# Patient Record
Sex: MALE | Race: WHITE | NOT HISPANIC OR LATINO
[De-identification: names, ages, dates, MRNs, and addresses within clinical notes are randomized per-mention and may not be internally consistent; named-entity substitution may affect disease eponyms.]

---

## 2018-05-08 ENCOUNTER — APPOINTMENT (OUTPATIENT)
Dept: GASTROENTEROLOGY | Facility: CLINIC | Age: 68
End: 2018-05-08

## 2018-05-08 ENCOUNTER — APPOINTMENT (OUTPATIENT)
Dept: GASTROENTEROLOGY | Facility: CLINIC | Age: 68
End: 2018-05-08
Payer: COMMERCIAL

## 2018-05-08 ENCOUNTER — TRANSCRIPTION ENCOUNTER (OUTPATIENT)
Age: 68
End: 2018-05-08

## 2018-05-08 VITALS
HEIGHT: 71 IN | BODY MASS INDEX: 31.78 KG/M2 | TEMPERATURE: 98.2 F | DIASTOLIC BLOOD PRESSURE: 80 MMHG | SYSTOLIC BLOOD PRESSURE: 120 MMHG | WEIGHT: 227 LBS

## 2018-05-08 DIAGNOSIS — Z87.39 PERSONAL HISTORY OF OTHER DISEASES OF THE MUSCULOSKELETAL SYSTEM AND CONNECTIVE TISSUE: ICD-10-CM

## 2018-05-08 DIAGNOSIS — Z82.49 FAMILY HISTORY OF ISCHEMIC HEART DISEASE AND OTHER DISEASES OF THE CIRCULATORY SYSTEM: ICD-10-CM

## 2018-05-08 DIAGNOSIS — M25.511 PAIN IN RIGHT SHOULDER: ICD-10-CM

## 2018-05-08 DIAGNOSIS — Z86.79 PERSONAL HISTORY OF OTHER DISEASES OF THE CIRCULATORY SYSTEM: ICD-10-CM

## 2018-05-08 DIAGNOSIS — Z78.9 OTHER SPECIFIED HEALTH STATUS: ICD-10-CM

## 2018-05-08 DIAGNOSIS — Z23 ENCOUNTER FOR IMMUNIZATION: ICD-10-CM

## 2018-05-08 DIAGNOSIS — Z87.891 PERSONAL HISTORY OF NICOTINE DEPENDENCE: ICD-10-CM

## 2018-05-08 DIAGNOSIS — R73.03 PREDIABETES.: ICD-10-CM

## 2018-05-08 DIAGNOSIS — Z80.8 FAMILY HISTORY OF MALIGNANT NEOPLASM OF OTHER ORGANS OR SYSTEMS: ICD-10-CM

## 2018-05-08 PROBLEM — Z00.00 ENCOUNTER FOR PREVENTIVE HEALTH EXAMINATION: Status: ACTIVE | Noted: 2018-05-08

## 2018-05-08 PROCEDURE — 99244 OFF/OP CNSLTJ NEW/EST MOD 40: CPT

## 2018-05-08 PROCEDURE — 82270 OCCULT BLOOD FECES: CPT

## 2018-05-11 ENCOUNTER — RX RENEWAL (OUTPATIENT)
Age: 68
End: 2018-05-11

## 2018-05-18 ENCOUNTER — OUTPATIENT (OUTPATIENT)
Dept: OUTPATIENT SERVICES | Facility: HOSPITAL | Age: 68
LOS: 1 days | Discharge: HOME | End: 2018-05-18
Payer: COMMERCIAL

## 2018-05-18 ENCOUNTER — APPOINTMENT (OUTPATIENT)
Dept: GASTROENTEROLOGY | Facility: HOSPITAL | Age: 68
End: 2018-05-18
Payer: COMMERCIAL

## 2018-05-18 ENCOUNTER — RESULT REVIEW (OUTPATIENT)
Age: 68
End: 2018-05-18

## 2018-05-18 DIAGNOSIS — D64.9 ANEMIA, UNSPECIFIED: ICD-10-CM

## 2018-05-18 LAB — GLUCOSE BLDC GLUCOMTR-MCNC: 160 MG/DL — HIGH (ref 70–99)

## 2018-05-18 PROCEDURE — 43239 EGD BIOPSY SINGLE/MULTIPLE: CPT | Mod: 59

## 2018-05-18 PROCEDURE — 43251 EGD REMOVE LESION SNARE: CPT | Mod: 59

## 2018-05-18 PROCEDURE — 45378 DIAGNOSTIC COLONOSCOPY: CPT

## 2018-05-18 PROCEDURE — 88305 TISSUE EXAM BY PATHOLOGIST: CPT | Mod: 26

## 2018-05-22 LAB — SURGICAL PATHOLOGY STUDY: SIGNIFICANT CHANGE UP

## 2018-06-05 ENCOUNTER — APPOINTMENT (OUTPATIENT)
Dept: GASTROENTEROLOGY | Facility: CLINIC | Age: 68
End: 2018-06-05
Payer: COMMERCIAL

## 2018-06-05 VITALS
WEIGHT: 220 LBS | BODY MASS INDEX: 30.8 KG/M2 | SYSTOLIC BLOOD PRESSURE: 130 MMHG | HEIGHT: 71 IN | DIASTOLIC BLOOD PRESSURE: 70 MMHG | TEMPERATURE: 98.8 F

## 2018-06-05 PROCEDURE — 82270 OCCULT BLOOD FECES: CPT

## 2018-06-05 PROCEDURE — 99214 OFFICE O/P EST MOD 30 MIN: CPT

## 2018-06-05 RX ORDER — SODIUM SULFATE, POTASSIUM SULFATE, MAGNESIUM SULFATE 17.5; 3.13; 1.6 G/ML; G/ML; G/ML
17.5-3.13-1.6 SOLUTION, CONCENTRATE ORAL
Qty: 1 | Refills: 0 | Status: DISCONTINUED | COMMUNITY
Start: 2018-05-08 | End: 2018-06-05

## 2018-06-19 ENCOUNTER — APPOINTMENT (OUTPATIENT)
Dept: GASTROENTEROLOGY | Facility: CLINIC | Age: 68
End: 2018-06-19
Payer: COMMERCIAL

## 2018-06-19 PROCEDURE — 91110 GI TRC IMG INTRAL ESOPH-ILE: CPT

## 2018-07-05 ENCOUNTER — RX RENEWAL (OUTPATIENT)
Age: 68
End: 2018-07-05

## 2018-07-17 ENCOUNTER — RESULT REVIEW (OUTPATIENT)
Age: 68
End: 2018-07-17

## 2018-07-17 ENCOUNTER — OUTPATIENT (OUTPATIENT)
Dept: OUTPATIENT SERVICES | Facility: HOSPITAL | Age: 68
LOS: 1 days | Discharge: HOME | End: 2018-07-17
Payer: COMMERCIAL

## 2018-07-17 ENCOUNTER — APPOINTMENT (OUTPATIENT)
Dept: GASTROENTEROLOGY | Facility: HOSPITAL | Age: 68
End: 2018-07-17

## 2018-07-17 DIAGNOSIS — R19.5 OTHER FECAL ABNORMALITIES: ICD-10-CM

## 2018-07-17 PROCEDURE — 88307 TISSUE EXAM BY PATHOLOGIST: CPT | Mod: 26

## 2018-07-17 PROCEDURE — 43251 EGD REMOVE LESION SNARE: CPT | Mod: 59,GC

## 2018-07-17 PROCEDURE — 43270 EGD LESION ABLATION: CPT | Mod: GC

## 2018-07-26 ENCOUNTER — CHART COPY (OUTPATIENT)
Age: 68
End: 2018-07-26

## 2018-08-17 ENCOUNTER — OTHER (OUTPATIENT)
Age: 68
End: 2018-08-17

## 2018-09-05 ENCOUNTER — RX RENEWAL (OUTPATIENT)
Age: 68
End: 2018-09-05

## 2018-10-03 ENCOUNTER — RX RENEWAL (OUTPATIENT)
Age: 68
End: 2018-10-03

## 2018-10-05 ENCOUNTER — APPOINTMENT (OUTPATIENT)
Dept: GASTROENTEROLOGY | Facility: CLINIC | Age: 68
End: 2018-10-05
Payer: COMMERCIAL

## 2018-10-05 VITALS
TEMPERATURE: 97.9 F | WEIGHT: 223 LBS | HEIGHT: 71 IN | DIASTOLIC BLOOD PRESSURE: 80 MMHG | SYSTOLIC BLOOD PRESSURE: 125 MMHG | BODY MASS INDEX: 31.22 KG/M2

## 2018-10-05 DIAGNOSIS — Z09 ENCOUNTER FOR FOLLOW-UP EXAMINATION AFTER COMPLETED TREATMENT FOR CONDITIONS OTHER THAN MALIGNANT NEOPLASM: ICD-10-CM

## 2018-10-05 DIAGNOSIS — R19.5 OTHER FECAL ABNORMALITIES: ICD-10-CM

## 2018-10-05 PROCEDURE — 99214 OFFICE O/P EST MOD 30 MIN: CPT

## 2018-11-26 ENCOUNTER — OUTPATIENT (OUTPATIENT)
Dept: OUTPATIENT SERVICES | Facility: HOSPITAL | Age: 68
LOS: 1 days | End: 2018-11-26
Payer: COMMERCIAL

## 2018-11-26 VITALS
TEMPERATURE: 97 F | RESPIRATION RATE: 14 BRPM | OXYGEN SATURATION: 98 % | SYSTOLIC BLOOD PRESSURE: 126 MMHG | DIASTOLIC BLOOD PRESSURE: 80 MMHG | WEIGHT: 224.43 LBS | HEART RATE: 67 BPM | HEIGHT: 70.5 IN

## 2018-11-26 DIAGNOSIS — I10 ESSENTIAL (PRIMARY) HYPERTENSION: ICD-10-CM

## 2018-11-26 DIAGNOSIS — C44.90 UNSPECIFIED MALIGNANT NEOPLASM OF SKIN, UNSPECIFIED: Chronic | ICD-10-CM

## 2018-11-26 DIAGNOSIS — R73.03 PREDIABETES: ICD-10-CM

## 2018-11-26 DIAGNOSIS — K43.9 VENTRAL HERNIA WITHOUT OBSTRUCTION OR GANGRENE: ICD-10-CM

## 2018-11-26 DIAGNOSIS — M12.9 ARTHROPATHY, UNSPECIFIED: Chronic | ICD-10-CM

## 2018-11-26 DIAGNOSIS — R58 HEMORRHAGE, NOT ELSEWHERE CLASSIFIED: Chronic | ICD-10-CM

## 2018-11-26 DIAGNOSIS — R06.83 SNORING: ICD-10-CM

## 2018-11-26 DIAGNOSIS — H26.9 UNSPECIFIED CATARACT: Chronic | ICD-10-CM

## 2018-11-26 LAB
ALBUMIN SERPL ELPH-MCNC: 4.3 G/DL — SIGNIFICANT CHANGE UP (ref 3.3–5)
ALP SERPL-CCNC: 85 U/L — SIGNIFICANT CHANGE UP (ref 40–120)
ALT FLD-CCNC: 25 U/L — SIGNIFICANT CHANGE UP (ref 4–41)
AST SERPL-CCNC: 20 U/L — SIGNIFICANT CHANGE UP (ref 4–40)
BILIRUB SERPL-MCNC: 0.4 MG/DL — SIGNIFICANT CHANGE UP (ref 0.2–1.2)
BUN SERPL-MCNC: 22 MG/DL — SIGNIFICANT CHANGE UP (ref 7–23)
CALCIUM SERPL-MCNC: 9.6 MG/DL — SIGNIFICANT CHANGE UP (ref 8.4–10.5)
CHLORIDE SERPL-SCNC: 100 MMOL/L — SIGNIFICANT CHANGE UP (ref 98–107)
CO2 SERPL-SCNC: 21 MMOL/L — LOW (ref 22–31)
CREAT SERPL-MCNC: 1.26 MG/DL — SIGNIFICANT CHANGE UP (ref 0.5–1.3)
GLUCOSE SERPL-MCNC: 184 MG/DL — HIGH (ref 70–99)
HBA1C BLD-MCNC: 6.9 % — HIGH (ref 4–5.6)
HCT VFR BLD CALC: 36.3 % — LOW (ref 39–50)
HGB BLD-MCNC: 11.7 G/DL — LOW (ref 13–17)
MCHC RBC-ENTMCNC: 26.3 PG — LOW (ref 27–34)
MCHC RBC-ENTMCNC: 32.2 % — SIGNIFICANT CHANGE UP (ref 32–36)
MCV RBC AUTO: 81.6 FL — SIGNIFICANT CHANGE UP (ref 80–100)
NRBC # FLD: 0 — SIGNIFICANT CHANGE UP
PLATELET # BLD AUTO: 171 K/UL — SIGNIFICANT CHANGE UP (ref 150–400)
PMV BLD: 11.7 FL — SIGNIFICANT CHANGE UP (ref 7–13)
POTASSIUM SERPL-MCNC: 3.9 MMOL/L — SIGNIFICANT CHANGE UP (ref 3.5–5.3)
POTASSIUM SERPL-SCNC: 3.9 MMOL/L — SIGNIFICANT CHANGE UP (ref 3.5–5.3)
PROT SERPL-MCNC: 7.6 G/DL — SIGNIFICANT CHANGE UP (ref 6–8.3)
RBC # BLD: 4.45 M/UL — SIGNIFICANT CHANGE UP (ref 4.2–5.8)
RBC # FLD: 15.4 % — HIGH (ref 10.3–14.5)
SODIUM SERPL-SCNC: 135 MMOL/L — SIGNIFICANT CHANGE UP (ref 135–145)
WBC # BLD: 8.58 K/UL — SIGNIFICANT CHANGE UP (ref 3.8–10.5)
WBC # FLD AUTO: 8.58 K/UL — SIGNIFICANT CHANGE UP (ref 3.8–10.5)

## 2018-11-26 PROCEDURE — 93010 ELECTROCARDIOGRAM REPORT: CPT

## 2018-11-26 RX ORDER — SODIUM CHLORIDE 9 MG/ML
1000 INJECTION, SOLUTION INTRAVENOUS
Qty: 0 | Refills: 0 | Status: DISCONTINUED | OUTPATIENT
Start: 2018-12-10 | End: 2018-12-25

## 2018-11-26 NOTE — H&P PST ADULT - PROBLEM SELECTOR PLAN 3
Await old ekg for comparison from pcp Await old ekg for comparison from pcp  * Instructed to stop metformin 24 hours before surgery  * notified OR booking via fax of prediabetes  * Instructed to take normal am dose of valsartan - Hctz, and omeprazole the am of surgery

## 2018-11-26 NOTE — H&P PST ADULT - NSANTHOSAYNRD_GEN_A_CORE
No. HUMBLE screening performed.  STOP BANG Legend: 0-2 = LOW Risk; 3-4 = INTERMEDIATE Risk; 5-8 = HIGH Risk

## 2018-11-26 NOTE — H&P PST ADULT - PMH
Alcohol abuse  quit in 2015  Anemia  "had 2 tears in my small intestines which were cauterized approximately July 2018'  GERD (gastroesophageal reflux disease)    Hypercholesterolemia    Hypertension    Prediabetes  diagnosed in 2017  Ventral hernia  December 2018 Alcohol abuse  quit in 2015  Anemia  "had 2 tears in my small intestines which were cauterized approximately July 2018'  GERD (gastroesophageal reflux disease)    Hypercholesterolemia    Hypertension    Prediabetes  diagnosed in 2017  Snoring  HUMBLE precautions -- responds affirmatively to STOP BANG questionnaire  Ventral hernia  December 2018

## 2018-11-26 NOTE — H&P PST ADULT - PSH
Arthropathy  Dec. 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

## 2018-11-26 NOTE — H&P PST ADULT - HISTORY OF PRESENT ILLNESS
This is a 67 y/o male who presents with progressively worsening symptomatology of ventral hernia. Evaluated by MD who recommended intervention. Scheduled for repair of ventral hernia on 12-10-18

## 2018-11-26 NOTE — H&P PST ADULT - SOURCE OF INFORMATION, PROFILE
patient/cell [phone]; home [phone]; wife, Rachna, cell [phone] can receive information

## 2018-11-27 ENCOUNTER — RX RENEWAL (OUTPATIENT)
Age: 68
End: 2018-11-27

## 2018-12-09 ENCOUNTER — TRANSCRIPTION ENCOUNTER (OUTPATIENT)
Age: 68
End: 2018-12-09

## 2018-12-10 ENCOUNTER — OUTPATIENT (OUTPATIENT)
Dept: OUTPATIENT SERVICES | Facility: HOSPITAL | Age: 68
LOS: 1 days | Discharge: HOME | End: 2018-12-10

## 2018-12-10 VITALS
DIASTOLIC BLOOD PRESSURE: 69 MMHG | HEART RATE: 58 BPM | SYSTOLIC BLOOD PRESSURE: 106 MMHG | OXYGEN SATURATION: 96 % | TEMPERATURE: 98 F | RESPIRATION RATE: 16 BRPM | WEIGHT: 224.43 LBS | HEIGHT: 70 IN

## 2018-12-10 VITALS
SYSTOLIC BLOOD PRESSURE: 110 MMHG | TEMPERATURE: 97 F | OXYGEN SATURATION: 96 % | DIASTOLIC BLOOD PRESSURE: 91 MMHG | RESPIRATION RATE: 16 BRPM | HEART RATE: 63 BPM

## 2018-12-10 DIAGNOSIS — H26.9 UNSPECIFIED CATARACT: Chronic | ICD-10-CM

## 2018-12-10 DIAGNOSIS — C44.90 UNSPECIFIED MALIGNANT NEOPLASM OF SKIN, UNSPECIFIED: Chronic | ICD-10-CM

## 2018-12-10 DIAGNOSIS — M12.9 ARTHROPATHY, UNSPECIFIED: Chronic | ICD-10-CM

## 2018-12-10 DIAGNOSIS — K43.9 VENTRAL HERNIA WITHOUT OBSTRUCTION OR GANGRENE: ICD-10-CM

## 2018-12-10 DIAGNOSIS — R58 HEMORRHAGE, NOT ELSEWHERE CLASSIFIED: Chronic | ICD-10-CM

## 2018-12-10 LAB — GLUCOSE BLDC GLUCOMTR-MCNC: 161 MG/DL — HIGH (ref 70–99)

## 2018-12-10 RX ORDER — ONDANSETRON 8 MG/1
4 TABLET, FILM COATED ORAL ONCE
Qty: 0 | Refills: 0 | Status: DISCONTINUED | OUTPATIENT
Start: 2018-12-10 | End: 2018-12-10

## 2018-12-10 RX ORDER — FERROUS SULFATE 325(65) MG
0 TABLET ORAL
Qty: 0 | Refills: 0 | COMMUNITY

## 2018-12-10 RX ORDER — FENTANYL CITRATE 50 UG/ML
25 INJECTION INTRAVENOUS
Qty: 0 | Refills: 0 | Status: DISCONTINUED | OUTPATIENT
Start: 2018-12-10 | End: 2018-12-10

## 2018-12-10 RX ORDER — OXYCODONE HYDROCHLORIDE 5 MG/1
10 TABLET ORAL ONCE
Qty: 0 | Refills: 0 | Status: DISCONTINUED | OUTPATIENT
Start: 2018-12-10 | End: 2018-12-25

## 2018-12-10 RX ORDER — FENTANYL CITRATE 50 UG/ML
50 INJECTION INTRAVENOUS ONCE
Qty: 0 | Refills: 0 | Status: DISCONTINUED | OUTPATIENT
Start: 2018-12-10 | End: 2018-12-10

## 2018-12-10 RX ORDER — METFORMIN HYDROCHLORIDE 850 MG/1
0 TABLET ORAL
Qty: 0 | Refills: 0 | COMMUNITY

## 2018-12-10 NOTE — ASU DISCHARGE PLAN (ADULT/PEDIATRIC). - NOTIFY
Unable to Urinate/Persistent Nausea and Vomiting/Pain not relieved by Medications/Fever greater than 101

## 2018-12-10 NOTE — BRIEF OPERATIVE NOTE - PROCEDURE
<<-----Click on this checkbox to enter Procedure Ventral hernia repair with mesh  12/10/2018    Active  [place]

## 2018-12-10 NOTE — ASU DISCHARGE PLAN (ADULT/PEDIATRIC). - CONDITIONS AT DISCHARGE
once pacu d/c criteria met once pacu d/c criteria met Verbalizing good understanding of discharge instructions and medication review.Discharge criteria met.  Cleared for discharge home by anesthesia.  Escorted to entrance  discharged home. Patient voided.

## 2018-12-10 NOTE — ASU PATIENT PROFILE, ADULT - PMH
Alcohol abuse  quit in 2015  Anemia  "had 2 tears in my small intestines which were cauterized approximately July 2018'  GERD (gastroesophageal reflux disease)    Hypercholesterolemia    Hypertension    Prediabetes  diagnosed in 2017  Snoring  HUMBLE precautions -- responds affirmatively to STOP BANG questionnaire  Ventral hernia  December 2018

## 2018-12-10 NOTE — ASU PREOP CHECKLIST - COMMENTS
pt took omeprazol and valsartan at 4 am with a sip of water pt took omeprazole and valsartan at 4 am with a sip of water

## 2018-12-10 NOTE — ASU DISCHARGE PLAN (ADULT/PEDIATRIC). - SPECIAL INSTRUCTIONS
- Ice for swelling  - Remove outer dressing tomorrow, leave steri strips in place  - Ibuprofen or Aleve for pain, Percocet for breakthrough pain  - follow up with Dr. Lee in the office in 7-10 days

## 2018-12-10 NOTE — ASU DISCHARGE PLAN (ADULT/PEDIATRIC). - MEDICATION SUMMARY - MEDICATIONS TO TAKE
I will START or STAY ON the medications listed below when I get home from the hospital:    oxyCODONE-acetaminophen 5 mg-325 mg oral tablet  -- 1-2 tab(s) by mouth every 6 hours, As Needed -for moderate pain - for severe pain MDD:6 tabs   -- Caution federal law prohibits the transfer of this drug to any person other  than the person for whom it was prescribed.  May cause drowsiness.  Alcohol may intensify this effect.  Use care when operating dangerous machinery.  This prescription cannot be refilled.  This product contains acetaminophen.  Do not use  with any other product containing acetaminophen to prevent possible liver damage.  Using more of this medication than prescribed may cause serious breathing problems.    -- Indication: For post op pain

## 2018-12-10 NOTE — ASU PATIENT PROFILE, ADULT - PSH
Arthropathy  Dec. 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

## 2018-12-30 ENCOUNTER — RX RENEWAL (OUTPATIENT)
Age: 68
End: 2018-12-30

## 2019-01-15 ENCOUNTER — APPOINTMENT (OUTPATIENT)
Dept: GASTROENTEROLOGY | Facility: CLINIC | Age: 69
End: 2019-01-15
Payer: MEDICARE

## 2019-01-15 VITALS
SYSTOLIC BLOOD PRESSURE: 132 MMHG | HEART RATE: 94 BPM | HEIGHT: 71 IN | TEMPERATURE: 98 F | OXYGEN SATURATION: 96 % | WEIGHT: 224 LBS | BODY MASS INDEX: 31.36 KG/M2 | RESPIRATION RATE: 16 BRPM | DIASTOLIC BLOOD PRESSURE: 80 MMHG

## 2019-01-15 DIAGNOSIS — Q27.33 ARTERIOVENOUS MALFORMATION OF DIGESTIVE SYSTEM VESSEL: ICD-10-CM

## 2019-01-15 DIAGNOSIS — D64.9 ANEMIA, UNSPECIFIED: ICD-10-CM

## 2019-01-15 PROCEDURE — 99214 OFFICE O/P EST MOD 30 MIN: CPT

## 2019-01-15 RX ORDER — FERROUS SULFATE TAB EC 324 MG (65 MG FE EQUIVALENT) 324 (65 FE) MG
324 (65 FE) TABLET DELAYED RESPONSE ORAL
Qty: 60 | Refills: 1 | Status: DISCONTINUED | COMMUNITY
Start: 2018-05-08 | End: 2019-01-15

## 2019-01-15 NOTE — ASSESSMENT
[FreeTextEntry1] : Patient is stable with improving blood counts and iron levels. He will continue iron supplements daily. He will reduce his PPI to every other day. FOBT will be checked

## 2019-01-15 NOTE — HISTORY OF PRESENT ILLNESS
[FreeTextEntry1] : Patient is a 68-year-old gentleman with improvement in his counts on iron supplements. His ferritin is still 11 but iron/TIBC 73/411, percent saturation 18%, H./H2 0.5/37.8, MCV 84.\par He is status post umbilical hernia repair. He denies any abdominal pain, his bowel movements are regular, he has no upper gastrointestinal symptoms. He continues to take one iron tablet per day.

## 2019-02-06 LAB — HEMOCCULT STL QL IA: NEGATIVE

## 2019-04-24 NOTE — H&P PST ADULT - PRO TOBACCO TYPE
Please send out result letter with the following note, thanks.    Romayne,    Ere are the results of your stress test which was normal as we discussed on the phone.  Lets try upping the exercise level and see if the breathing improves.    -Sapphire Glass, CNP cigarettes/quit 15 years ago

## 2019-08-21 ENCOUNTER — TRANSCRIPTION ENCOUNTER (OUTPATIENT)
Age: 69
End: 2019-08-21

## 2019-09-02 PROBLEM — Z09 FOLLOW UP: Status: ACTIVE | Noted: 2018-06-05

## 2021-02-09 PROBLEM — I10 ESSENTIAL (PRIMARY) HYPERTENSION: Chronic | Status: ACTIVE | Noted: 2018-11-26

## 2021-02-09 PROBLEM — F10.10 ALCOHOL ABUSE, UNCOMPLICATED: Chronic | Status: ACTIVE | Noted: 2018-11-26

## 2021-02-09 PROBLEM — K43.9 VENTRAL HERNIA WITHOUT OBSTRUCTION OR GANGRENE: Chronic | Status: ACTIVE | Noted: 2018-11-26

## 2021-02-09 PROBLEM — E78.00 PURE HYPERCHOLESTEROLEMIA, UNSPECIFIED: Chronic | Status: ACTIVE | Noted: 2018-11-26

## 2021-02-09 PROBLEM — D64.9 ANEMIA, UNSPECIFIED: Chronic | Status: ACTIVE | Noted: 2018-11-26

## 2021-02-09 PROBLEM — K21.9 GASTRO-ESOPHAGEAL REFLUX DISEASE WITHOUT ESOPHAGITIS: Chronic | Status: ACTIVE | Noted: 2018-11-26

## 2021-02-16 ENCOUNTER — OUTPATIENT (OUTPATIENT)
Dept: OUTPATIENT SERVICES | Facility: HOSPITAL | Age: 71
LOS: 1 days | Discharge: HOME | End: 2021-02-16
Payer: OTHER MISCELLANEOUS

## 2021-02-16 VITALS
OXYGEN SATURATION: 98 % | HEART RATE: 85 BPM | RESPIRATION RATE: 18 BRPM | TEMPERATURE: 98 F | WEIGHT: 225.53 LBS | HEIGHT: 71 IN | SYSTOLIC BLOOD PRESSURE: 123 MMHG | DIASTOLIC BLOOD PRESSURE: 76 MMHG

## 2021-02-16 DIAGNOSIS — M25.511 PAIN IN RIGHT SHOULDER: ICD-10-CM

## 2021-02-16 DIAGNOSIS — C44.90 UNSPECIFIED MALIGNANT NEOPLASM OF SKIN, UNSPECIFIED: Chronic | ICD-10-CM

## 2021-02-16 DIAGNOSIS — E78.5 HYPERLIPIDEMIA, UNSPECIFIED: ICD-10-CM

## 2021-02-16 DIAGNOSIS — E11.9 TYPE 2 DIABETES MELLITUS WITHOUT COMPLICATIONS: ICD-10-CM

## 2021-02-16 DIAGNOSIS — Z01.818 ENCOUNTER FOR OTHER PREPROCEDURAL EXAMINATION: ICD-10-CM

## 2021-02-16 DIAGNOSIS — M12.9 ARTHROPATHY, UNSPECIFIED: Chronic | ICD-10-CM

## 2021-02-16 DIAGNOSIS — K21.9 GASTRO-ESOPHAGEAL REFLUX DISEASE WITHOUT ESOPHAGITIS: ICD-10-CM

## 2021-02-16 DIAGNOSIS — R58 HEMORRHAGE, NOT ELSEWHERE CLASSIFIED: Chronic | ICD-10-CM

## 2021-02-16 DIAGNOSIS — I10 ESSENTIAL (PRIMARY) HYPERTENSION: ICD-10-CM

## 2021-02-16 DIAGNOSIS — H26.9 UNSPECIFIED CATARACT: Chronic | ICD-10-CM

## 2021-02-16 DIAGNOSIS — Z98.890 OTHER SPECIFIED POSTPROCEDURAL STATES: Chronic | ICD-10-CM

## 2021-02-16 DIAGNOSIS — S43.491D OTHER SPRAIN OF RIGHT SHOULDER JOINT, SUBSEQUENT ENCOUNTER: ICD-10-CM

## 2021-02-16 LAB
ANION GAP SERPL CALC-SCNC: 10 MMOL/L — SIGNIFICANT CHANGE UP (ref 5–17)
APTT BLD: 31.9 SEC — SIGNIFICANT CHANGE UP (ref 27.5–35.5)
BASOPHILS # BLD AUTO: 0.05 K/UL — SIGNIFICANT CHANGE UP (ref 0–0.2)
BASOPHILS NFR BLD AUTO: 0.6 % — SIGNIFICANT CHANGE UP (ref 0–2)
BUN SERPL-MCNC: 20 MG/DL — SIGNIFICANT CHANGE UP (ref 7–23)
CALCIUM SERPL-MCNC: 9.1 MG/DL — SIGNIFICANT CHANGE UP (ref 8.5–10.1)
CHLORIDE SERPL-SCNC: 105 MMOL/L — SIGNIFICANT CHANGE UP (ref 96–108)
CO2 SERPL-SCNC: 24 MMOL/L — SIGNIFICANT CHANGE UP (ref 22–31)
CREAT SERPL-MCNC: 1.29 MG/DL — SIGNIFICANT CHANGE UP (ref 0.5–1.3)
EOSINOPHIL # BLD AUTO: 0.44 K/UL — SIGNIFICANT CHANGE UP (ref 0–0.5)
EOSINOPHIL NFR BLD AUTO: 5.1 % — SIGNIFICANT CHANGE UP (ref 0–6)
GLUCOSE SERPL-MCNC: 191 MG/DL — HIGH (ref 70–99)
HCT VFR BLD CALC: 41.5 % — SIGNIFICANT CHANGE UP (ref 39–50)
HGB BLD-MCNC: 13.3 G/DL — SIGNIFICANT CHANGE UP (ref 13–17)
IMM GRANULOCYTES NFR BLD AUTO: 0.5 % — SIGNIFICANT CHANGE UP (ref 0–1.5)
INR BLD: 1.04 RATIO — SIGNIFICANT CHANGE UP (ref 0.88–1.16)
LYMPHOCYTES # BLD AUTO: 1.91 K/UL — SIGNIFICANT CHANGE UP (ref 1–3.3)
LYMPHOCYTES # BLD AUTO: 22.1 % — SIGNIFICANT CHANGE UP (ref 13–44)
MCHC RBC-ENTMCNC: 27 PG — SIGNIFICANT CHANGE UP (ref 27–34)
MCHC RBC-ENTMCNC: 32 GM/DL — SIGNIFICANT CHANGE UP (ref 32–36)
MCV RBC AUTO: 84.2 FL — SIGNIFICANT CHANGE UP (ref 80–100)
MONOCYTES # BLD AUTO: 0.59 K/UL — SIGNIFICANT CHANGE UP (ref 0–0.9)
MONOCYTES NFR BLD AUTO: 6.8 % — SIGNIFICANT CHANGE UP (ref 2–14)
NEUTROPHILS # BLD AUTO: 5.6 K/UL — SIGNIFICANT CHANGE UP (ref 1.8–7.4)
NEUTROPHILS NFR BLD AUTO: 64.9 % — SIGNIFICANT CHANGE UP (ref 43–77)
NRBC # BLD: 0 /100 WBCS — SIGNIFICANT CHANGE UP (ref 0–0)
PLATELET # BLD AUTO: 207 K/UL — SIGNIFICANT CHANGE UP (ref 150–400)
POTASSIUM SERPL-MCNC: 3.7 MMOL/L — SIGNIFICANT CHANGE UP (ref 3.5–5.3)
POTASSIUM SERPL-SCNC: 3.7 MMOL/L — SIGNIFICANT CHANGE UP (ref 3.5–5.3)
PROTHROM AB SERPL-ACNC: 12 SEC — SIGNIFICANT CHANGE UP (ref 10.6–13.6)
RBC # BLD: 4.93 M/UL — SIGNIFICANT CHANGE UP (ref 4.2–5.8)
RBC # FLD: 16.1 % — HIGH (ref 10.3–14.5)
SODIUM SERPL-SCNC: 139 MMOL/L — SIGNIFICANT CHANGE UP (ref 135–145)
WBC # BLD: 8.63 K/UL — SIGNIFICANT CHANGE UP (ref 3.8–10.5)
WBC # FLD AUTO: 8.63 K/UL — SIGNIFICANT CHANGE UP (ref 3.8–10.5)

## 2021-02-16 PROCEDURE — 93010 ELECTROCARDIOGRAM REPORT: CPT

## 2021-02-16 NOTE — H&P PST ADULT - NSICDXFAMILYHX_GEN_ALL_CORE_FT
FAMILY HISTORY:  Father  Still living? No  Family history of myocardial infarction, Age at diagnosis: Age Unknown

## 2021-02-16 NOTE — H&P PST ADULT - NSICDXPASTSURGICALHX_GEN_ALL_CORE_FT
PAST SURGICAL HISTORY:  Arthropathy July 2016, right knee replacement    Arthropathy Dec. 2016, left knee replacement    Bleeding with gastroscopy, repaired 2 tears in the "small intestines"    Cataract b/l eye surgery with lens implants    History of lymph node dissection of left axilla     Skin cancer squamous cell on arm, leg, and hand -- had Moh's surgery

## 2021-02-16 NOTE — H&P PST ADULT - ASSESSMENT
right shoulder rotator cuff tear  CAPRINI SCORE    AGE RELATED RISK FACTORS                                                       MOBILITY RELATED FACTORS  [ ] Age 41-60 years                                            (1 Point)                  [ ] Bed rest                                                        (1 Point)  [x ] Age: 61-74 years                                           (2 Points)                [ ] Plaster cast                                                   (2 Points)  [ ] Age= 75 years                                              (3 Points)                 [ ] Bed bound for more than 72 hours                   (2 Points)    DISEASE RELATED RISK FACTORS                                               GENDER SPECIFIC FACTORS  [ ] Edema in the lower extremities                       (1 Point)                  [ ] Pregnancy                                                     (1 Point)  [ ] Varicose veins                                               (1 Point)                  [ ] Post-partum < 6 weeks                                   (1 Point)             x[ ] BMI > 25 Kg/m2                                            (1 Point)                  [ ] Hormonal therapy  or oral contraception            (1 Point)                 [ ] Sepsis (in the previous month)                        (1 Point)                  [ ] History of pregnancy complications  [ ] Pneumonia or serious lung disease                                               [ ] Unexplained or recurrent                       (1 Point)           (in the previous month)                               (1 Point)  [ ] Abnormal pulmonary function test                     (1 Point)                 SURGERY RELATED RISK FACTORS  [ ] Acute myocardial infarction                              (1 Point)                 [ ]  Section                                            (1 Point)  [ ] Congestive heart failure (in the previous month)  (1 Point)                 [ ] Minor surgery                                                 (1 Point)   [ ] Inflammatory bowel disease                             (1 Point)                 [ x] Arthroscopic surgery                                        (2 Points)  [ ] Central venous access                                    (2 Points)                [ ] General surgery lasting more than 45 minutes   (2 Points)       [ ] Stroke (in the previous month)                          (5 Points)               [ ] Elective arthroplasty                                        (5 Points)                                                                                                                                               HEMATOLOGY RELATED FACTORS                                                 TRAUMA RELATED RISK FACTORS  [ ] Prior episodes of VTE                                     (3 Points)                 [ ] Fracture of the hip, pelvis, or leg                       (5 Points)  [ ] Positive family history for VTE                         (3 Points)                 [ ] Acute spinal cord injury (in the previous month)  (5 Points)  [ ] Prothrombin 17525 A                                      (3 Points)                 [ ] Paralysis  (less than 1 month)                          (5 Points)  [ ] Factor V Leiden                                             (3 Points)                 [ ] Multiple Trauma within 1 month                         (5 Points)  [ ] Lupus anticoagulants                                     (3 Points)                                                           [ ] Anticardiolipin antibodies                                (3 Points)                                                       [ ] High homocysteine in the blood                      (3 Points)                                             [ ] Other congenital or acquired thrombophilia       (3 Points)                                                [ ] Heparin induced thrombocytopenia                  (3 Points)                                          Total Score [      5    ]  Caprini Score 0-2: Low risk, No VTE Prophylaxis required for most patient's, encourage ambulation  Caprini Score 3-6: At Risk, Pharmacologic VTE prophylaxis is indicated for most patients ( in the absence of a contraindication)  Caprini Score Greater than or = 7: High Risk , pharmacologic VTE is indicated for most patients ( in the absence of a contraindication)    Caprini score indicates that the patient is high risk for VTE event ( score 6 or greater). Surgical patient's in this group will benefit from both pharmacologic prophylaxis and intermittent compression devices . Surgical team will determine the balance between VTE  risk and bleeding risk and other clinical considerations

## 2021-02-16 NOTE — H&P PST ADULT - ATTENDING COMMENTS
I discussed all possible risks of r shoulder surgery.  I discussed tears may not be repairable.  Pt wishes to proceed. No guarantees made all questions answered

## 2021-02-16 NOTE — H&P PST ADULT - NSICDXPROBLEM_GEN_ALL_CORE_FT
PROBLEM DIAGNOSES  Problem: Right shoulder pain  Assessment and Plan: scheduled for right shoulder arthroscopy    Problem: Preoperative examination  Assessment and Plan: Preop instructions provided including NPO status. Hibiclens wash for infection control. Patient aware to stop NSAID, OTC herbals  for 7-10 days, needs to be accoumpanied by adult upon discharge.  Patient verbalized understanding  patient instructed on having covid19 swab 3 days prior to surgery      Problem: HTN (hypertension)  Assessment and Plan: continue meds      Problem: Chronic GERD  Assessment and Plan: continue meds      Problem: HLD (hyperlipidemia)  Assessment and Plan: continue meds      Problem: DM (diabetes mellitus)  Assessment and Plan: continue meds

## 2021-02-16 NOTE — H&P PST ADULT - HISTORY OF PRESENT ILLNESS
71 yo male , pmh- htn, gerd,dm, hld c/o right shoulder pain secondary to work injury on sept 17, 2020. right shoulder paion worse despite conservative management- secondary to possible rotator cuff tear - scheduled for right shoulder arthroscopy  denies recent travels in the past 30 days. No fever, SOB, cough, flu like symptoms or body rash- covid screen

## 2021-02-16 NOTE — H&P PST ADULT - HEALTH CARE MAINTENANCE
flu vaccine   covid19 vaccine ( moderna)  1st dose january 31  pneumonia vaccine in the past    colonoscopy - ok

## 2021-02-16 NOTE — H&P PST ADULT - NSICDXPASTMEDICALHX_GEN_ALL_CORE_FT
PAST MEDICAL HISTORY:  Alcohol abuse quit in 2015    Anemia "had 2 tears in my small intestines which were cauterized approximately July 2018'    GERD (gastroesophageal reflux disease)     Hypercholesterolemia     Hypertension     Prediabetes diagnosed in 2017    Snoring HUMBLE precautions -- responds affirmatively to STOP BANG questionnaire    Ventral hernia December 2018

## 2021-02-17 LAB
A1C WITH ESTIMATED AVERAGE GLUCOSE RESULT: 7 % — HIGH (ref 4–5.6)
ESTIMATED AVERAGE GLUCOSE: 154 MG/DL — HIGH (ref 68–114)

## 2021-02-18 DIAGNOSIS — Z01.818 ENCOUNTER FOR OTHER PREPROCEDURAL EXAMINATION: ICD-10-CM

## 2021-02-19 ENCOUNTER — APPOINTMENT (OUTPATIENT)
Dept: DISASTER EMERGENCY | Facility: CLINIC | Age: 71
End: 2021-02-19

## 2021-02-20 LAB — SARS-COV-2 N GENE NPH QL NAA+PROBE: NOT DETECTED

## 2021-02-21 ENCOUNTER — TRANSCRIPTION ENCOUNTER (OUTPATIENT)
Age: 71
End: 2021-02-21

## 2021-02-22 ENCOUNTER — OUTPATIENT (OUTPATIENT)
Dept: OUTPATIENT SERVICES | Facility: HOSPITAL | Age: 71
LOS: 1 days | Discharge: HOME | End: 2021-02-22

## 2021-02-22 VITALS
OXYGEN SATURATION: 98 % | WEIGHT: 218.04 LBS | HEART RATE: 77 BPM | RESPIRATION RATE: 16 BRPM | TEMPERATURE: 98 F | DIASTOLIC BLOOD PRESSURE: 72 MMHG | SYSTOLIC BLOOD PRESSURE: 125 MMHG | HEIGHT: 71 IN

## 2021-02-22 VITALS
DIASTOLIC BLOOD PRESSURE: 64 MMHG | SYSTOLIC BLOOD PRESSURE: 119 MMHG | OXYGEN SATURATION: 95 % | RESPIRATION RATE: 20 BRPM | HEART RATE: 63 BPM

## 2021-02-22 DIAGNOSIS — M12.9 ARTHROPATHY, UNSPECIFIED: Chronic | ICD-10-CM

## 2021-02-22 DIAGNOSIS — H26.9 UNSPECIFIED CATARACT: Chronic | ICD-10-CM

## 2021-02-22 DIAGNOSIS — M75.100 UNSPECIFIED ROTATOR CUFF TEAR OR RUPTURE OF UNSPECIFIED SHOULDER, NOT SPECIFIED AS TRAUMATIC: ICD-10-CM

## 2021-02-22 DIAGNOSIS — R58 HEMORRHAGE, NOT ELSEWHERE CLASSIFIED: Chronic | ICD-10-CM

## 2021-02-22 DIAGNOSIS — C44.90 UNSPECIFIED MALIGNANT NEOPLASM OF SKIN, UNSPECIFIED: Chronic | ICD-10-CM

## 2021-02-22 DIAGNOSIS — Z98.890 OTHER SPECIFIED POSTPROCEDURAL STATES: Chronic | ICD-10-CM

## 2021-02-22 LAB — GLUCOSE BLDC GLUCOMTR-MCNC: 150 MG/DL — HIGH (ref 70–99)

## 2021-02-22 RX ORDER — SODIUM CHLORIDE 9 MG/ML
3 INJECTION INTRAMUSCULAR; INTRAVENOUS; SUBCUTANEOUS EVERY 8 HOURS
Refills: 0 | Status: DISCONTINUED | OUTPATIENT
Start: 2021-02-22 | End: 2021-02-22

## 2021-02-22 RX ORDER — HYDROMORPHONE HYDROCHLORIDE 2 MG/ML
0.25 INJECTION INTRAMUSCULAR; INTRAVENOUS; SUBCUTANEOUS
Refills: 0 | Status: DISCONTINUED | OUTPATIENT
Start: 2021-02-22 | End: 2021-02-22

## 2021-02-22 RX ORDER — OXYCODONE HYDROCHLORIDE 5 MG/1
1 TABLET ORAL
Qty: 42 | Refills: 0
Start: 2021-02-22 | End: 2021-02-28

## 2021-02-22 RX ORDER — ONDANSETRON 8 MG/1
4 TABLET, FILM COATED ORAL ONCE
Refills: 0 | Status: DISCONTINUED | OUTPATIENT
Start: 2021-02-22 | End: 2021-02-22

## 2021-02-22 RX ORDER — HYDROMORPHONE HYDROCHLORIDE 2 MG/ML
0.5 INJECTION INTRAMUSCULAR; INTRAVENOUS; SUBCUTANEOUS
Refills: 0 | Status: DISCONTINUED | OUTPATIENT
Start: 2021-02-22 | End: 2021-02-22

## 2021-02-22 RX ORDER — DOCUSATE SODIUM 100 MG
1 CAPSULE ORAL
Qty: 21 | Refills: 0
Start: 2021-02-22 | End: 2021-02-28

## 2021-02-22 RX ORDER — SODIUM CHLORIDE 9 MG/ML
1000 INJECTION, SOLUTION INTRAVENOUS
Refills: 0 | Status: DISCONTINUED | OUTPATIENT
Start: 2021-02-22 | End: 2021-02-22

## 2021-02-22 RX ADMIN — SODIUM CHLORIDE 100 MILLILITER(S): 9 INJECTION, SOLUTION INTRAVENOUS at 11:54

## 2021-02-22 NOTE — BRIEF OPERATIVE NOTE - OPERATION/FINDINGS
Right shoulder arthroscopy, massive rotator cuff tear, rotator cuff repair, labral debridement, subacromial decompression

## 2021-02-22 NOTE — ASU PATIENT PROFILE, ADULT - PSH
Arthropathy  Dec. 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

## 2021-02-22 NOTE — ASU DISCHARGE PLAN (ADULT/PEDIATRIC) - ASU DC SPECIAL INSTRUCTIONSFT
Post -Operative Shoulder Arthroscopy Instructions    Anesthesia:  - No alcoholic beverages, including beer and wine, for 24 hours or while on prescribed pain medications  - Do not make any important decisions or sign any legal documents  - Do not drive or operate machinery for 24 hours  - You are required upon discharge to leave the surgical center with a responsible adult who will drive you home    Surgery:  - Keep sling on until being seen in office.  You can move fingers, wrist and elbow while in sling  - Keep clean and dry for 3 days  - Remove dressing in 3 days and cover wounds with band-aids, you can then shower  - Take pain medication as prescribed  - Resume regular diet  - Follow-up in approximately 1 week      Notify your doctor if you develop: fever, chills, excessive swelling, drainage, pain not controlled by pain medication, persistent numbness in hand or fingers     IF AN EMERGENCY ARISES , CALL 911 AND/OR GO TO THE EMERGENCY ROOM

## 2021-02-22 NOTE — ASU DISCHARGE PLAN (ADULT/PEDIATRIC) - CARE PROVIDER_API CALL
Nelson Jacobson (DO)  Orthopaedic Surgery  [address] [place] [address]  Phone: [phone]  Fax: [phone]  Follow Up Time:
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
## 2021-02-25 DIAGNOSIS — K21.9 GASTRO-ESOPHAGEAL REFLUX DISEASE WITHOUT ESOPHAGITIS: ICD-10-CM

## 2021-02-25 DIAGNOSIS — E11.9 TYPE 2 DIABETES MELLITUS WITHOUT COMPLICATIONS: ICD-10-CM
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
## 2021-02-25 DIAGNOSIS — I10 ESSENTIAL (PRIMARY) HYPERTENSION: ICD-10-CM

## 2021-02-25 DIAGNOSIS — S43.431A SUPERIOR GLENOID LABRUM LESION OF RIGHT SHOULDER, INITIAL ENCOUNTER: ICD-10-CM

## 2021-02-25 DIAGNOSIS — M65.9 SYNOVITIS AND TENOSYNOVITIS, UNSPECIFIED: ICD-10-CM

## 2021-02-25 DIAGNOSIS — S43.491A OTHER SPRAIN OF RIGHT SHOULDER JOINT, INITIAL ENCOUNTER: ICD-10-CM

## 2021-02-25 DIAGNOSIS — Z87.891 PERSONAL HISTORY OF NICOTINE DEPENDENCE: ICD-10-CM

## 2021-02-25 DIAGNOSIS — Z79.84 LONG TERM (CURRENT) USE OF ORAL HYPOGLYCEMIC DRUGS: ICD-10-CM
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
## 2021-02-25 DIAGNOSIS — M75.51 BURSITIS OF RIGHT SHOULDER: ICD-10-CM

## 2021-02-25 DIAGNOSIS — E78.00 PURE HYPERCHOLESTEROLEMIA, UNSPECIFIED: ICD-10-CM

## 2021-02-25 DIAGNOSIS — S46.011A STRAIN OF MUSCLE(S) AND TENDON(S) OF THE ROTATOR CUFF OF RIGHT SHOULDER, INITIAL ENCOUNTER: ICD-10-CM

## 2021-02-25 DIAGNOSIS — Z85.828 PERSONAL HISTORY OF OTHER MALIGNANT NEOPLASM OF SKIN: ICD-10-CM

## 2021-02-25 DIAGNOSIS — Z96.653 PRESENCE OF ARTIFICIAL KNEE JOINT, BILATERAL: ICD-10-CM

## 2021-02-25 DIAGNOSIS — M75.41 IMPINGEMENT SYNDROME OF RIGHT SHOULDER: ICD-10-CM

## 2021-02-25 DIAGNOSIS — M24.111 OTHER ARTICULAR CARTILAGE DISORDERS, RIGHT SHOULDER: ICD-10-CM

## 2021-09-09 NOTE — ASU PATIENT PROFILE, ADULT - AS SC BRADEN NUTRITION
Writer has letter for work for pt regarding pending covid test results.  Called pt and left message to see if he wants this mailed to him or if he wants us to fax it.  If so, what is the fax number.     Left message for pt to call back.    (4) excellent

## 2022-07-11 ENCOUNTER — LABORATORY RESULT (OUTPATIENT)
Age: 72
End: 2022-07-11

## 2022-07-11 ENCOUNTER — APPOINTMENT (OUTPATIENT)
Dept: GASTROENTEROLOGY | Facility: CLINIC | Age: 72
End: 2022-07-11

## 2022-07-11 VITALS
BODY MASS INDEX: 29.82 KG/M2 | HEIGHT: 71 IN | WEIGHT: 213 LBS | DIASTOLIC BLOOD PRESSURE: 80 MMHG | TEMPERATURE: 97.2 F | OXYGEN SATURATION: 92 % | HEART RATE: 62 BPM | RESPIRATION RATE: 17 BRPM | SYSTOLIC BLOOD PRESSURE: 112 MMHG

## 2022-07-11 DIAGNOSIS — R63.5 ABNORMAL WEIGHT GAIN: ICD-10-CM

## 2022-07-11 DIAGNOSIS — K31.9 DISEASE OF STOMACH AND DUODENUM, UNSPECIFIED: ICD-10-CM

## 2022-07-11 DIAGNOSIS — R10.9 UNSPECIFIED ABDOMINAL PAIN: ICD-10-CM

## 2022-07-11 PROCEDURE — 99204 OFFICE O/P NEW MOD 45 MIN: CPT | Mod: 25

## 2022-07-11 PROCEDURE — 36415 COLL VENOUS BLD VENIPUNCTURE: CPT

## 2022-07-11 RX ORDER — TRAZODONE HYDROCHLORIDE 100 MG/1
100 TABLET ORAL
Qty: 90 | Refills: 0 | Status: DISCONTINUED | COMMUNITY
Start: 2017-11-17 | End: 2022-07-11

## 2022-07-11 NOTE — PHYSICAL EXAM
[General Appearance - Alert] : alert [General Appearance - In No Acute Distress] : in no acute distress [Sclera] : the sclera and conjunctiva were normal [PERRL With Normal Accommodation] : pupils were equal in size, round, and reactive to light [Extraocular Movements] : extraocular movements were intact [Outer Ear] : the ears and nose were normal in appearance [Oropharynx] : the oropharynx was normal [Neck Appearance] : the appearance of the neck was normal [Neck Cervical Mass (___cm)] : no neck mass was observed [Jugular Venous Distention Increased] : there was no jugular-venous distention [Thyroid Diffuse Enlargement] : the thyroid was not enlarged [Thyroid Nodule] : there were no palpable thyroid nodules [] : no respiratory distress [Auscultation Breath Sounds / Voice Sounds] : lungs were clear to auscultation bilaterally [Heart Rate And Rhythm] : heart rate was normal and rhythm regular [Heart Sounds] : normal S1 and S2 [Heart Sounds Gallop] : no gallops [Murmurs] : no murmurs [Heart Sounds Pericardial Friction Rub] : no pericardial rub [Obese] : obese [Normal] : normal to percussion [Diffuse] : there was tenderness diffusely on palpation [Cervical Lymph Nodes Enlarged Posterior Bilaterally] : posterior cervical [Cervical Lymph Nodes Enlarged Anterior Bilaterally] : anterior cervical [Supraclavicular Lymph Nodes Enlarged Bilaterally] : supraclavicular [Axillary Lymph Nodes Enlarged Bilaterally] : axillary [Femoral Lymph Nodes Enlarged Bilaterally] : femoral [Inguinal Lymph Nodes Enlarged Bilaterally] : inguinal [No CVA Tenderness] : no ~M costovertebral angle tenderness [No Spinal Tenderness] : no spinal tenderness [Abnormal Walk] : normal gait [Nail Clubbing] : no clubbing  or cyanosis of the fingernails [Musculoskeletal - Swelling] : no joint swelling seen [Motor Tone] : muscle strength and tone were normal [Deep Tendon Reflexes (DTR)] : deep tendon reflexes were 2+ and symmetric [Sensation] : the sensory exam was normal to light touch and pinprick [No Focal Deficits] : no focal deficits [Oriented To Time, Place, And Person] : oriented to person, place, and time [Impaired Insight] : insight and judgment were intact [Affect] : the affect was normal [Firm] : not firm [Rigid] : not rigid [Rebound] : no rebound [Guarding] : no guarding [Norris's] : a negative Norris's sign

## 2022-07-11 NOTE — HISTORY OF PRESENT ILLNESS
[Heartburn] : denies heartburn [Nausea] : denies nausea [Vomiting] : resolved vomiting [Diarrhea] : improved diarrhea [Constipation] : denies constipation [Yellow Skin Or Eyes (Jaundice)] : denies jaundice [Abdominal Pain] : stable abdominal pain [Abdominal Swelling] : denies abdominal swelling [Rectal Pain] : denies rectal pain [GERD] : gastroesophageal reflux disease [Malignancy] : malignancy [Abdominal Surgery] : abdominal surgery [Hiatus Hernia] : no hiatus hernia [Peptic Ulcer Disease] : no peptic ulcer disease [Pancreatitis] : no pancreatitis [Cholelithiasis] : no cholelithiasis [Kidney Stone] : no kidney stone [Inflammatory Bowel Disease] : no inflammatory bowel disease [Irritable Bowel Syndrome] : no irritable bowel syndrome [Diverticulitis] : no diverticulitis [Alcohol Abuse] : no alcohol abuse [Appendectomy] : no appendectomy [Cholecystectomy] : no cholecystectomy [de-identified] : 71 year old male presents for evaluation of resolved diarrhea and abdominal pain diffusely located but more tender on the left side. Patient states on June 27 he developed diarrhea a fever of 104 and abdominal pain. He is unsure if this was related to food that he consumed. He states he is no longer febrile and took Imodium x2 days and diarrhea resolved. He is still having abdominal tenderness. He does complain of a hemorrhoidal flare up that is currently resolving. Denies rectal bleeding, blood in the stool, melena, or hematemesis. Last Colonoscopy was 2018 as per report normal f/u colonoscopy due in 10 years.  [de-identified] : skin cancer \par umbilical hernia repair 2019

## 2022-07-11 NOTE — REVIEW OF SYSTEMS
[As Noted in HPI] : as noted in HPI [Abdominal Pain] : abdominal pain [Vomiting] : vomiting [Diarrhea] : diarrhea [Negative] : Heme/Lymph

## 2022-07-11 NOTE — ASSESSMENT
[FreeTextEntry1] : Attending Attestation \par Diarrhea\par \par We discussed diarrhea at length. Treatment depends on the cause and severity of your diarrhea. You\par should drink plenty of fluids to avoid dehydration. You should avoid drinks that contain caffeine and\par milk. Milk may make diarrhea worse. Your doctor may recommend hydration drinks for your infant or\par child. People with severe dehydration may need fluid replacement via an IV line and hospitalization.\par Avoid eating greasy foods, fatty foods, and alcohol. Bananas, applesauce, rice, and toast are helpful\par foods to eat. If you feel too sick to eat, try sucking on ice chips until you can tolerate food.\par \par If diarrhea returns may take Imodium but should call office to notify. May need stool cxs at that time.\par \par Ct Scan of the Abdomen and pelvis ordered The risks benefits alternatives and complications of the procedure/s were explained to the patient at\par length. The patient was agreeable and we will proceed. As per Dr Pa recommendations \par \par Cipro and Flagyl ordered for 7 days. Medication reviewed side effects and adverse reactions. \par \par Blood ordered as per patient request cbc, cmp, tsh, lipid, along with FOBT and Urinaysls \par \par Instructions provided how to obtain stool sample and where to return specimen. \par \par Time spent with patient 45 min \par \par Patient verbalized understanding of all information provided, All questions answered and reviewed.

## 2022-07-12 LAB
ALBUMIN SERPL ELPH-MCNC: 4.5 G/DL
ALP BLD-CCNC: 150 U/L
ALT SERPL-CCNC: 20 U/L
ANION GAP SERPL CALC-SCNC: 16 MMOL/L
APPEARANCE: CLEAR
AST SERPL-CCNC: 21 U/L
BASOPHILS # BLD AUTO: 0.06 K/UL
BASOPHILS NFR BLD AUTO: 0.4 %
BILIRUB SERPL-MCNC: 0.7 MG/DL
BILIRUBIN URINE: NEGATIVE
BLOOD URINE: NEGATIVE
BUN SERPL-MCNC: 13 MG/DL
CALCIUM SERPL-MCNC: 9.6 MG/DL
CHLORIDE SERPL-SCNC: 97 MMOL/L
CHOLEST SERPL-MCNC: 143 MG/DL
CO2 SERPL-SCNC: 27 MMOL/L
COLOR: YELLOW
CREAT SERPL-MCNC: 1.08 MG/DL
EGFR: 74 ML/MIN/1.73M2
EOSINOPHIL # BLD AUTO: 0.23 K/UL
EOSINOPHIL NFR BLD AUTO: 1.6 %
GLUCOSE QUALITATIVE U: NEGATIVE
GLUCOSE SERPL-MCNC: 131 MG/DL
HCT VFR BLD CALC: 41.5 %
HDLC SERPL-MCNC: 30 MG/DL
HGB BLD-MCNC: 13.1 G/DL
IMM GRANULOCYTES NFR BLD AUTO: 0.7 %
KETONES URINE: NEGATIVE
LDLC SERPL CALC-MCNC: 69 MG/DL
LEUKOCYTE ESTERASE URINE: NEGATIVE
LYMPHOCYTES # BLD AUTO: 1.92 K/UL
LYMPHOCYTES NFR BLD AUTO: 13.3 %
MAN DIFF?: NORMAL
MCHC RBC-ENTMCNC: 28.5 PG
MCHC RBC-ENTMCNC: 31.6 GM/DL
MCV RBC AUTO: 90.4 FL
MONOCYTES # BLD AUTO: 0.73 K/UL
MONOCYTES NFR BLD AUTO: 5.1 %
NEUTROPHILS # BLD AUTO: 11.35 K/UL
NEUTROPHILS NFR BLD AUTO: 78.9 %
NITRITE URINE: NEGATIVE
NONHDLC SERPL-MCNC: 112 MG/DL
PH URINE: 6
PLATELET # BLD AUTO: 284 K/UL
POTASSIUM SERPL-SCNC: 3.5 MMOL/L
PROT SERPL-MCNC: 7.5 G/DL
PROTEIN URINE: ABNORMAL
RBC # BLD: 4.59 M/UL
RBC # FLD: 14.8 %
SODIUM SERPL-SCNC: 140 MMOL/L
SPECIFIC GRAVITY URINE: 1.03
TRIGL SERPL-MCNC: 214 MG/DL
TSH SERPL-ACNC: 3.9 UIU/ML
UROBILINOGEN URINE: NORMAL
WBC # FLD AUTO: 14.39 K/UL

## 2022-07-13 ENCOUNTER — OUTPATIENT (OUTPATIENT)
Dept: OUTPATIENT SERVICES | Facility: HOSPITAL | Age: 72
LOS: 1 days | End: 2022-07-13
Payer: MEDICARE

## 2022-07-13 ENCOUNTER — APPOINTMENT (OUTPATIENT)
Dept: CT IMAGING | Facility: IMAGING CENTER | Age: 72
End: 2022-07-13

## 2022-07-13 DIAGNOSIS — R10.9 UNSPECIFIED ABDOMINAL PAIN: ICD-10-CM

## 2022-07-13 DIAGNOSIS — M12.9 ARTHROPATHY, UNSPECIFIED: Chronic | ICD-10-CM

## 2022-07-13 DIAGNOSIS — Z98.890 OTHER SPECIFIED POSTPROCEDURAL STATES: Chronic | ICD-10-CM

## 2022-07-13 DIAGNOSIS — C44.90 UNSPECIFIED MALIGNANT NEOPLASM OF SKIN, UNSPECIFIED: Chronic | ICD-10-CM

## 2022-07-13 DIAGNOSIS — Z00.8 ENCOUNTER FOR OTHER GENERAL EXAMINATION: ICD-10-CM

## 2022-07-13 DIAGNOSIS — K31.9 DISEASE OF STOMACH AND DUODENUM, UNSPECIFIED: ICD-10-CM

## 2022-07-13 DIAGNOSIS — R58 HEMORRHAGE, NOT ELSEWHERE CLASSIFIED: Chronic | ICD-10-CM

## 2022-07-13 DIAGNOSIS — H26.9 UNSPECIFIED CATARACT: Chronic | ICD-10-CM

## 2022-07-13 LAB — HEMOCCULT STL QL IA: NEGATIVE

## 2022-07-13 PROCEDURE — 74177 CT ABD & PELVIS W/CONTRAST: CPT

## 2022-07-13 PROCEDURE — 74177 CT ABD & PELVIS W/CONTRAST: CPT | Mod: 26

## 2022-07-18 ENCOUNTER — APPOINTMENT (OUTPATIENT)
Dept: GASTROENTEROLOGY | Facility: CLINIC | Age: 72
End: 2022-07-18

## 2022-07-18 VITALS
BODY MASS INDEX: 29.82 KG/M2 | HEIGHT: 71 IN | TEMPERATURE: 96.4 F | SYSTOLIC BLOOD PRESSURE: 103 MMHG | HEART RATE: 76 BPM | DIASTOLIC BLOOD PRESSURE: 63 MMHG | RESPIRATION RATE: 18 BRPM | WEIGHT: 213 LBS | OXYGEN SATURATION: 96 %

## 2022-07-18 DIAGNOSIS — K20.90 ESOPHAGITIS, UNSPECIFIED WITHOUT BLEEDING: ICD-10-CM

## 2022-07-18 DIAGNOSIS — K62.89 OTHER SPECIFIED DISEASES OF ANUS AND RECTUM: ICD-10-CM

## 2022-07-18 DIAGNOSIS — R19.7 DIARRHEA, UNSPECIFIED: ICD-10-CM

## 2022-07-18 PROCEDURE — 99215 OFFICE O/P EST HI 40 MIN: CPT

## 2022-07-18 RX ORDER — AMOXICILLIN 500 MG/1
500 CAPSULE ORAL
Qty: 21 | Refills: 0 | Status: DISCONTINUED | COMMUNITY
Start: 2022-02-28 | End: 2022-07-18

## 2022-07-18 RX ORDER — CEFADROXIL 500 MG/1
500 CAPSULE ORAL
Qty: 20 | Refills: 0 | Status: DISCONTINUED | COMMUNITY
Start: 2022-06-06 | End: 2022-07-18

## 2022-07-18 NOTE — ASSESSMENT
[FreeTextEntry1] : Diarrhea. Dietary and lifestyle modification discussed with the patient.  Patient finishing course of antibiotics.  We will get GI PCR for stool and stool C. 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  Patient referred to hepatology.\par Rectal pain rule out perirectal abscess.  Patient referred to surgery for evaluation.  Abnormal CT of the abdomen showing esophageal abnormality.  EGD to be scheduled.\par  This was all discussed with the patient.  He understands and all questions were answered.\par

## 2022-07-18 NOTE — PHYSICAL EXAM
[General Appearance - Alert] : alert [General Appearance - In No Acute Distress] : in no acute distress [Neck Appearance] : the appearance of the neck was normal [Neck Cervical Mass (___cm)] : no neck mass was observed [Jugular Venous Distention Increased] : there was no jugular-venous distention [Thyroid Diffuse Enlargement] : the thyroid was not enlarged [Thyroid Nodule] : there were no palpable thyroid nodules [Auscultation Breath Sounds / Voice Sounds] : lungs were clear to auscultation bilaterally [Heart Rate And Rhythm] : heart rate was normal and rhythm regular [Heart Sounds] : normal S1 and S2 [Heart Sounds Gallop] : no gallops [Murmurs] : no murmurs [Heart Sounds Pericardial Friction Rub] : no pericardial rub [Bowel Sounds] : normal bowel sounds [Abdomen Soft] : soft [Abdomen Tenderness] : non-tender [] : no hepato-splenomegaly [Abdomen Mass (___ Cm)] : no abdominal mass palpated [Normal Sphincter Tone] : normal sphincter tone [No Rectal Mass] : no rectal mass [External Hemorrhoid] : external hemorrhoids [Occult Blood Positive] : stool was negative for occult blood [FreeTextEntry1] : rectal tenderness r/o abcess [Cervical Lymph Nodes Enlarged Posterior Bilaterally] : posterior cervical [Cervical Lymph Nodes Enlarged Anterior Bilaterally] : anterior cervical [Supraclavicular Lymph Nodes Enlarged Bilaterally] : supraclavicular [Axillary Lymph Nodes Enlarged Bilaterally] : axillary [Femoral Lymph Nodes Enlarged Bilaterally] : femoral [Inguinal Lymph Nodes Enlarged Bilaterally] : inguinal [No CVA Tenderness] : no ~M costovertebral angle tenderness [No Spinal Tenderness] : no spinal tenderness

## 2022-07-18 NOTE — HISTORY OF PRESENT ILLNESS
[de-identified] : 71-year-old man feeling somewhat improved after taking the antibiotics.  He is completing his antibiotic regimen today.  However he still having significant rectal pain and tenderness with bowel movements.  His bowel movements remain loose but are less frequent.  He denies rectal bleeding, melena or hematemesis.  CT of the abdomen was significant for the presence of cirrhosis.  The patient is not aware of having cirrhosis of the liver although he was a heavy drinker until several years ago.  CT of the abdomen also showed some thickening in the distal esophagus.  EGD is recommended.  He has a history of gastric polyps that were resected in 2018.  He denies rectal bleeding, melena or hematemesis.

## 2022-07-28 ENCOUNTER — NON-APPOINTMENT (OUTPATIENT)
Age: 72
End: 2022-07-28

## 2022-07-28 LAB
C DIFF TOX GENS STL QL NAA+PROBE: NORMAL
CDIFF BY PCR: NOT DETECTED
GI PCR PANEL, STOOL: ABNORMAL

## 2022-08-03 LAB — GI PCR PANEL, STOOL: NORMAL

## 2022-08-17 ENCOUNTER — NON-APPOINTMENT (OUTPATIENT)
Age: 72
End: 2022-08-17

## 2022-08-22 ENCOUNTER — APPOINTMENT (OUTPATIENT)
Dept: GASTROENTEROLOGY | Facility: CLINIC | Age: 72
End: 2022-08-22

## 2022-08-23 DIAGNOSIS — Z20.822 ENCOUNTER FOR PREPROCEDURAL LABORATORY EXAMINATION: ICD-10-CM

## 2022-08-23 DIAGNOSIS — Z01.812 ENCOUNTER FOR PREPROCEDURAL LABORATORY EXAMINATION: ICD-10-CM

## 2022-08-25 ENCOUNTER — APPOINTMENT (OUTPATIENT)
Dept: GASTROENTEROLOGY | Facility: AMBULATORY MEDICAL SERVICES | Age: 72
End: 2022-08-25

## 2022-08-25 PROCEDURE — 43239 EGD BIOPSY SINGLE/MULTIPLE: CPT

## 2022-09-09 ENCOUNTER — APPOINTMENT (OUTPATIENT)
Dept: GASTROENTEROLOGY | Facility: CLINIC | Age: 72
End: 2022-09-09
Payer: MEDICARE

## 2022-09-09 VITALS
WEIGHT: 213 LBS | TEMPERATURE: 97.5 F | HEIGHT: 71 IN | HEART RATE: 80 BPM | OXYGEN SATURATION: 92 % | DIASTOLIC BLOOD PRESSURE: 77 MMHG | RESPIRATION RATE: 18 BRPM | BODY MASS INDEX: 29.82 KG/M2 | SYSTOLIC BLOOD PRESSURE: 131 MMHG

## 2022-09-09 DIAGNOSIS — E11.9 TYPE 2 DIABETES MELLITUS W/OUT COMPLICATIONS: ICD-10-CM

## 2022-09-09 DIAGNOSIS — R93.7 ABNORMAL FINDINGS ON DIAGNOSTIC IMAGING OF OTHER PARTS OF MUSCULOSKELETAL SYSTEM: ICD-10-CM

## 2022-09-09 DIAGNOSIS — K31.7 POLYP OF STOMACH AND DUODENUM: ICD-10-CM

## 2022-09-09 DIAGNOSIS — G62.9 POLYNEUROPATHY, UNSPECIFIED: ICD-10-CM

## 2022-09-09 PROCEDURE — 99214 OFFICE O/P EST MOD 30 MIN: CPT

## 2022-09-15 ENCOUNTER — APPOINTMENT (OUTPATIENT)
Dept: HEPATOLOGY | Facility: CLINIC | Age: 72
End: 2022-09-15

## 2022-09-15 VITALS
BODY MASS INDEX: 30.8 KG/M2 | HEART RATE: 70 BPM | SYSTOLIC BLOOD PRESSURE: 154 MMHG | WEIGHT: 220 LBS | HEIGHT: 71 IN | DIASTOLIC BLOOD PRESSURE: 78 MMHG | RESPIRATION RATE: 16 BRPM | OXYGEN SATURATION: 95 % | TEMPERATURE: 97.5 F

## 2022-09-15 DIAGNOSIS — R76.8 OTHER SPECIFIED ABNORMAL IMMUNOLOGICAL FINDINGS IN SERUM: ICD-10-CM

## 2022-09-15 DIAGNOSIS — Z87.898 PERSONAL HISTORY OF OTHER SPECIFIED CONDITIONS: ICD-10-CM

## 2022-09-15 DIAGNOSIS — E03.9 HYPOTHYROIDISM, UNSPECIFIED: ICD-10-CM

## 2022-09-15 DIAGNOSIS — E78.00 PURE HYPERCHOLESTEROLEMIA, UNSPECIFIED: ICD-10-CM

## 2022-09-15 DIAGNOSIS — Z86.59 PERSONAL HISTORY OF OTHER MENTAL AND BEHAVIORAL DISORDERS: ICD-10-CM

## 2022-09-15 DIAGNOSIS — E66.9 OBESITY, UNSPECIFIED: ICD-10-CM

## 2022-09-15 PROCEDURE — 99215 OFFICE O/P EST HI 40 MIN: CPT

## 2022-09-15 RX ORDER — HYDROCHLOROTHIAZIDE 25 MG/1
25 TABLET ORAL DAILY
Refills: 0 | Status: ACTIVE | COMMUNITY
Start: 2022-01-15

## 2022-09-15 RX ORDER — ATORVASTATIN CALCIUM 10 MG/1
10 TABLET, FILM COATED ORAL
Refills: 0 | Status: ACTIVE | COMMUNITY
Start: 2022-02-03

## 2022-09-15 RX ORDER — CIPROFLOXACIN HYDROCHLORIDE 500 MG/1
500 TABLET, FILM COATED ORAL TWICE DAILY
Qty: 14 | Refills: 0 | Status: DISCONTINUED | COMMUNITY
Start: 2022-07-11 | End: 2022-09-15

## 2022-09-15 RX ORDER — CHLORHEXIDINE GLUCONATE, 0.12% ORAL RINSE 1.2 MG/ML
0.12 SOLUTION DENTAL
Qty: 473 | Refills: 0 | Status: DISCONTINUED | COMMUNITY
Start: 2022-02-28 | End: 2022-09-15

## 2022-09-15 RX ORDER — METRONIDAZOLE 250 MG/1
250 TABLET ORAL 3 TIMES DAILY
Qty: 21 | Refills: 0 | Status: DISCONTINUED | COMMUNITY
Start: 2022-07-11 | End: 2022-09-15

## 2022-09-15 RX ORDER — SERTRALINE HYDROCHLORIDE 50 MG/1
50 TABLET, FILM COATED ORAL
Qty: 90 | Refills: 0 | Status: DISCONTINUED | COMMUNITY
Start: 2021-10-06 | End: 2022-09-15

## 2022-09-15 RX ORDER — VALSARTAN 320 MG/1
320 TABLET, COATED ORAL DAILY
Refills: 0 | Status: ACTIVE | COMMUNITY
Start: 2022-01-15

## 2022-09-15 RX ORDER — SERTRALINE 25 MG/1
25 TABLET, FILM COATED ORAL DAILY
Refills: 0 | Status: ACTIVE | COMMUNITY

## 2022-09-15 RX ORDER — VALSARTAN AND HYDROCHLOROTHIAZIDE 320; 25 MG/1; MG/1
320-25 TABLET, FILM COATED ORAL
Qty: 90 | Refills: 0 | Status: DISCONTINUED | COMMUNITY
Start: 2018-02-14 | End: 2022-09-15

## 2022-09-15 RX ORDER — DULAGLUTIDE 0.75 MG/.5ML
0.75 INJECTION, SOLUTION SUBCUTANEOUS
Refills: 0 | Status: ACTIVE | COMMUNITY

## 2022-09-15 RX ORDER — LEVOTHYROXINE SODIUM 0.03 MG/1
25 TABLET ORAL DAILY
Refills: 0 | Status: ACTIVE | COMMUNITY
Start: 2022-05-10

## 2022-09-15 RX ORDER — GABAPENTIN 400 MG/1
400 CAPSULE ORAL
Qty: 270 | Refills: 0 | Status: DISCONTINUED | COMMUNITY
Start: 2021-08-24 | End: 2022-09-15

## 2022-09-15 RX ORDER — GABAPENTIN 400 MG
400 TABLET ORAL 3 TIMES DAILY
Refills: 0 | Status: ACTIVE | COMMUNITY

## 2022-09-15 RX ORDER — METFORMIN HYDROCHLORIDE 500 MG/1
500 TABLET, COATED ORAL
Refills: 0 | Status: ACTIVE | COMMUNITY
Start: 2018-02-14

## 2022-09-15 RX ORDER — AZITHROMYCIN 500 MG/1
500 TABLET, FILM COATED ORAL DAILY
Qty: 1 | Refills: 0 | Status: DISCONTINUED | COMMUNITY
Start: 2022-07-29 | End: 2022-09-15

## 2022-09-15 RX ORDER — IBUPROFEN 600 MG/1
600 TABLET, FILM COATED ORAL
Qty: 20 | Refills: 0 | Status: DISCONTINUED | COMMUNITY
Start: 2022-02-28 | End: 2022-09-15

## 2022-09-18 ENCOUNTER — TRANSCRIPTION ENCOUNTER (OUTPATIENT)
Age: 72
End: 2022-09-18

## 2022-09-18 PROBLEM — R76.8 ELEVATED SERUM IMMUNOGLOBULIN FREE LIGHT CHAINS: Status: ACTIVE | Noted: 2022-09-18

## 2022-09-18 LAB
25(OH)D3 SERPL-MCNC: 39.1 NG/ML
A1AT SERPL-MCNC: 161 MG/DL
AFP-TM SERPL-MCNC: 2.7 NG/ML
ALBUMIN SERPL ELPH-MCNC: 4.8 G/DL
ALP BLD-CCNC: 102 U/L
ALT SERPL-CCNC: 22 U/L
ANION GAP SERPL CALC-SCNC: 13 MMOL/L
AST SERPL-CCNC: 21 U/L
BASOPHILS # BLD AUTO: 0.06 K/UL
BASOPHILS NFR BLD AUTO: 0.7 %
BILIRUB DIRECT SERPL-MCNC: 0.1 MG/DL
BILIRUB INDIRECT SERPL-MCNC: 0.2 MG/DL
BILIRUB SERPL-MCNC: 0.4 MG/DL
BUN SERPL-MCNC: 16 MG/DL
CALCIUM SERPL-MCNC: 10 MG/DL
CERULOPLASMIN SERPL-MCNC: 23 MG/DL
CHLORIDE SERPL-SCNC: 101 MMOL/L
CO2 SERPL-SCNC: 24 MMOL/L
CREAT SERPL-MCNC: 0.99 MG/DL
DEPRECATED KAPPA LC FREE/LAMBDA SER: 1.32 RATIO
EGFR: 82 ML/MIN/1.73M2
EOSINOPHIL # BLD AUTO: 0.52 K/UL
EOSINOPHIL NFR BLD AUTO: 6.2 %
FERRITIN SERPL-MCNC: 21 NG/ML
GLUCOSE SERPL-MCNC: 167 MG/DL
HBV CORE IGG+IGM SER QL: NONREACTIVE
HBV SURFACE AB SER QL: NONREACTIVE
HBV SURFACE AG SER QL: NONREACTIVE
HCT VFR BLD CALC: 42.5 %
HCV AB SER QL: NONREACTIVE
HCV S/CO RATIO: 0.11 S/CO
HEPATITIS A IGG ANTIBODY: NONREACTIVE
HGB BLD-MCNC: 13.5 G/DL
HIV1+2 AB SPEC QL IA.RAPID: NONREACTIVE
IGA SER QL IEP: 414 MG/DL
IGG SER QL IEP: 1110 MG/DL
IGM SER QL IEP: 64 MG/DL
IMM GRANULOCYTES NFR BLD AUTO: 0.4 %
INR PPP: 1.05 RATIO
IRON SATN MFR SERPL: 21 %
IRON SERPL-MCNC: 90 UG/DL
KAPPA LC CSF-MCNC: 3.11 MG/DL
KAPPA LC SERPL-MCNC: 4.1 MG/DL
LYMPHOCYTES # BLD AUTO: 1.76 K/UL
LYMPHOCYTES NFR BLD AUTO: 21 %
MAN DIFF?: NORMAL
MCHC RBC-ENTMCNC: 27.9 PG
MCHC RBC-ENTMCNC: 31.8 GM/DL
MCV RBC AUTO: 87.8 FL
MONOCYTES # BLD AUTO: 0.59 K/UL
MONOCYTES NFR BLD AUTO: 7 %
NEUTROPHILS # BLD AUTO: 5.42 K/UL
NEUTROPHILS NFR BLD AUTO: 64.7 %
PLATELET # BLD AUTO: 199 K/UL
POTASSIUM SERPL-SCNC: 4 MMOL/L
PROT SERPL-MCNC: 7.8 G/DL
PT BLD: 12.2 SEC
RBC # BLD: 4.84 M/UL
RBC # FLD: 14.3 %
SODIUM SERPL-SCNC: 138 MMOL/L
TIBC SERPL-MCNC: 423 UG/DL
UIBC SERPL-MCNC: 333 UG/DL
WBC # FLD AUTO: 8.38 K/UL

## 2022-09-18 NOTE — REVIEW OF SYSTEMS
[Recent Weight Loss (___ Lbs)] : recent [unfilled] ~Ulb weight loss [Depression] : depression [Negative] : Heme/Lymph [Fever] : no fever [Chills] : no chills [Abdominal Pain] : no abdominal pain [Vomiting] : no vomiting [Constipation] : no constipation [Diarrhea] : no diarrhea [Heartburn] : no heartburn [Melena] : no melena [Dysuria] : no dysuria [Itching] : no itching [Confused] : no confusion [FreeTextEntry2] : Few lb during 7/2022 [FreeTextEntry3] : Cataract surgery [FreeTextEntry7] : No nausea. No hematochezia.  [de-identified] : Controlled

## 2022-09-18 NOTE — PHYSICAL EXAM
[Spider Angioma] : Spider angioma(s) was(were) observed [Depression] : depression [General Appearance - Alert] : alert [General Appearance - In No Acute Distress] : in no acute distress [General Appearance - Well Nourished] : well nourished [General Appearance - Well Developed] : well developed [Sclera] : the sclera and conjunctiva were normal [Oropharynx] : the oropharynx was normal [Neck Appearance] : the appearance of the neck was normal [Jugular Venous Distention Increased] : there was no jugular-venous distention [] : no respiratory distress [Respiration, Rhythm And Depth] : normal respiratory rhythm and effort [Exaggerated Use Of Accessory Muscles For Inspiration] : no accessory muscle use [Auscultation Breath Sounds / Voice Sounds] : lungs were clear to auscultation bilaterally [Heart Rate And Rhythm] : heart rate was normal and rhythm regular [Heart Sounds] : normal S1 and S2 [Edema] : there was no peripheral edema [Obese] : obese [Normal] : normal [Soft, Nontender] : the abdomen was soft and nontender [None] : no CVA tenderness [Cervical Lymph Nodes Enlarged Posterior Bilaterally] : posterior cervical [Cervical Lymph Nodes Enlarged Anterior Bilaterally] : anterior cervical [Supraclavicular Lymph Nodes Enlarged Bilaterally] : supraclavicular [Axillary Lymph Nodes Enlarged Bilaterally] : axillary [Femoral Lymph Nodes Enlarged Bilaterally] : femoral [Inguinal Lymph Nodes Enlarged Bilaterally] : inguinal [No CVA Tenderness] : no ~M costovertebral angle tenderness [No Spinal Tenderness] : no spinal tenderness [Abnormal Walk] : normal gait [Oriented To Time, Place, And Person] : oriented to person, place, and time [Impaired Insight] : insight and judgment were intact [Affect] : the affect was normal [Mood] : the mood was normal [Scleral Icterus] : No Scleral Icterus [Abdominal  Ascites] : no ascites [Asterixis] : no asterixis observed [Jaundice] : No jaundice [Palmar Erythema] : no Palmar Erythema [FreeTextEntry7] : Controlled  [Dilated Collat. Veins] : no collateral vein dilation [Striae] : no striae [Firm] : not firm [Rigid] : not rigid [Rebound] : no rebound [Guarding] : no guarding [Norris's] : a negative Norris's sign [Liver Tender To Palpation] : not tender [FreeTextEntry1] : Grossly intact

## 2022-09-18 NOTE — HISTORY OF PRESENT ILLNESS
[Alcohol Abuse] : alcohol abuse [IV Drug Use] : no IV drug use [Tattoo] : no tattoos [Body Piercing] : no body piercing [Hemodialysis] : no hemodialysis [Transfusion before 1992] : no transfusion before 1992 [Transplant before 1992] : no transplant before 1992 [Cocaine Use] : no cocaine use [FreeTextEntry1] : 73 yo obese (BMI 30) Male with Hx of DM Type 2 (Dx around 2015, controlled on metformin and Trulicity), HLD (on atorvastatin), HTN, hypothyroidism, neuropathy, depression,  is here for initial evaluation for cirrhosis noted on CT a/p 7/13/22. Patient was not aware of liver disease prior. No FHx of liver disease.  He started to drink socially since 20s, but started to drink heavy at around age 62, daily, average 2/3 of a qt of Vodka, for about 2 years. He quit, was in a program. He resumed drinking around age 65. He has been completely abstinent since 3/2016. \par No other toxic habits. Denies Hx of hematemesis, ascites, jaundice, encephalopathy.\par EGD 8/25/22 showed erosive gastritis, hiatal hernia, 15 mm gastric polyp, ring in lower third of esophagus. Path: hyperplastic polyp. No HP. Mild chronic gastric inflammation. CT a/p showed esophageal wall thickening. \par Notably, the CT also showed a 1.7 cm lucent lesion in L2 spine, concerning for metastasis. \par He was seen in 7/2022 by GI, when found to have Camplylobacter infection.\par He had recent nuclear stress test on 9/13/22 and reportedly was normal. \par

## 2022-09-18 NOTE — ASSESSMENT
[FreeTextEntry1] : 73 yo obese (BMI 30) Male with Hx of DM Type (Dx around 2015, controlled on metformin and Trulicity), HLD (on atorvastatin), HTN, hypothyroidism, neuropathy, depression,  is here for initial evaluation for cirrhosis noted on CT a/p 7/13/22, without prior Hx of liver disease or signs of decompensation.Etiology possibly alcohol, but need to rule out other causes. \par \par \par Cirrhosis, without any signs or symptoms of decompensation.\par Ordered labs, including INR to calculate MELD, CPT. \par - Will get Fibroscan. \par - No ascites on physical exam and on US abdomen, no hx of SBP in past , not on SBP prophylaxis, on HCTZ for HTN. Normal BUN/Cr.\par - No signs of overt or minimal hepatic encephalopathy, avoid constipation.\par - Esophageal varix (EV) screening: Last EGD on 8/25/2022: no EVs. PLT > 150. Not on Beta blocker.\par - HCC screening: CT a/p w/ iv contrast 7/2022 w/o focal hepatic lesion, will repeat 1/2023. Will get AFP. \par - Etiology likely EtOH, but will send CLD workup.\par \par Health maintenance:\par - Will check Hep C, Hep B, Hep A serologies and HIV. \par - Will need vit D, DEXA. \par - Counseled on liver healthy diet, including but not limited to avoiding alcohol, illicit drug, avoiding eating any unpasteurized dairy products; avoiding eating any raw or undercooked eggs, fish, poultry, or meat; and avoiding eating raw/steamed oysters or other shellfish to avoid risk of Vibrio infection. \par - Advised on avoiding use of herbal and dietary supplements due to potential hepatotoxicity, and limit use of acetaminophen to <2 grams per day. Advised on avoiding use of nonsteroidal antiinflammatory drugs (NSAIDs) as these can precipitate renal dysfunction in patients with advanced liver disease. \par - F/u w/ PCP for flu, Pneumococcal vaccine, COVID-19\par - Diet, exercise as tolerated, weight loss.\par - Complete abstinence - will check PEth.\par \par Spinal lesion\par - Ordered bone scan, but further workup per PCP. Called his PCP and informed about result. \par \par Reports that his abdominal pain, diarrhea resolved, but patient to follow with GI and follow up GI recommendations. Colonoscopy? \par \par RTC 1 month

## 2022-09-20 ENCOUNTER — LABORATORY RESULT (OUTPATIENT)
Age: 72
End: 2022-09-20

## 2022-09-20 ENCOUNTER — APPOINTMENT (OUTPATIENT)
Dept: HEPATOLOGY | Facility: CLINIC | Age: 72
End: 2022-09-20
Payer: MEDICARE

## 2022-09-20 LAB
LKM AB SER QL IF: <20.1 UNITS
MITOCHONDRIA AB SER IF-ACNC: NORMAL
SMOOTH MUSCLE AB SER QL IF: NORMAL
SOLUBLE LIVER IGG SER IA-ACNC: 0.9
TTG IGA SER IA-ACNC: <1.2 U/ML
TTG IGA SER-ACNC: NEGATIVE

## 2022-09-20 PROCEDURE — 90472 IMMUNIZATION ADMIN EACH ADD: CPT

## 2022-09-20 PROCEDURE — 90739 HEPB VACC 2/4 DOSE ADULT IM: CPT

## 2022-09-20 PROCEDURE — 91200 LIVER ELASTOGRAPHY: CPT

## 2022-09-20 PROCEDURE — G0010: CPT

## 2022-09-20 PROCEDURE — 90632 HEPA VACCINE ADULT IM: CPT

## 2022-09-25 PROBLEM — G62.9 NEUROPATHY: Status: ACTIVE | Noted: 2022-09-15

## 2022-09-25 PROBLEM — E11.9 CONTROLLED TYPE 2 DIABETES MELLITUS: Status: ACTIVE | Noted: 2022-09-15

## 2022-09-25 PROBLEM — K31.7 GASTRIC POLYPS: Status: ACTIVE | Noted: 2018-10-05

## 2022-09-25 PROBLEM — R93.7 ABNORMAL CT OF SPINE: Status: ACTIVE | Noted: 2022-09-15

## 2022-09-25 RX ORDER — OMEPRAZOLE 40 MG/1
40 CAPSULE, DELAYED RELEASE ORAL
Qty: 90 | Refills: 0 | Status: ACTIVE | COMMUNITY
Start: 2018-04-27

## 2022-09-25 NOTE — ASSESSMENT
[FreeTextEntry1] : Patient with possible cirrhosis.  He was referred for hepatology consult.\par He also has a lesion on T12 which may be a metastatic lesion.  This needs to have further work-up.\par \par Patient had an upper endoscopy for thickening of the esophagus on CT scan.  There were some venous lakes versus cysts in the distal esophagus.  There was a Schatzki's ring and scope was passed with mild resistance.  He did have a hiatus hernia.  Patient had a 15 mm polyp.  Biopsies revealed this to be a hyperplastic polyp.\par Patient still has some dysphagia to both liquids and solids.  He will take omeprazole.\par He will need follow-up polypectomy and possible dilatation of the Schatzki's ring.

## 2022-09-25 NOTE — HISTORY OF PRESENT ILLNESS
[FreeTextEntry1] : Patient is a 72-year-old gentleman with history of diabetes, hypertension, neuropathy, and possible cirrhosis of the liver.  This was seen on CAT scan of 7/13/2022.  He does have a history of EtOH in the past.\par Patient had an EGD on 8/25/2022.  This showed evidence of gastritis and a gastric polyp.  Biopsies revealed this to be a hyperplastic polyp.  He also had a hiatus hernia and a Schatzki's ring in the distal esophagus.  CT scan revealed some thickening in the distal esophagus.  There were some venous lakes versus cysts seen in the distal esophagus.  The scope was passed with mild resistance.  Patient is on omeprazole.\par He was treated for a Campylobacter infection in July.\par CAT scan also revealed a lucent lesion at L2.  This is of concern for metastatic disease.

## 2022-09-25 NOTE — PHYSICAL EXAM
[Alert] : alert [Normal Voice/Communication] : normal voice/communication [Healthy Appearing] : healthy appearing [No Acute Distress] : no acute distress [Sclera] : the sclera and conjunctiva were normal [Hearing Threshold Finger Rub Not [place]] : hearing was normal [Normal Lips/Gums] : the lips and gums were normal [Oropharynx] : the oropharynx was normal [Normal Appearance] : the appearance of the neck was normal [No Neck Mass] : no neck mass was observed [No Respiratory Distress] : no respiratory distress [No Acc Muscle Use] : no accessory muscle use [Respiration, Rhythm And Depth] : normal respiratory rhythm and effort [Auscultation Breath Sounds / Voice Sounds] : lungs were clear to auscultation bilaterally [Heart Rate And Rhythm] : heart rate was normal and rhythm regular [Normal S1, S2] : normal S1 and S2 [Murmurs] : no murmurs [Bowel Sounds] : normal bowel sounds [Abdomen Tenderness] : non-tender [No Masses] : no abdominal mass palpated [Abdomen Soft] : soft [] : no hepatosplenomegaly [No CVA Tenderness] : no CVA  tenderness [No Spinal Tenderness] : no spinal tenderness [Abnormal Walk] : normal gait [Oriented To Time, Place, And Person] : oriented to person, place, and time

## 2022-09-26 ENCOUNTER — NON-APPOINTMENT (OUTPATIENT)
Age: 72
End: 2022-09-26

## 2022-09-27 ENCOUNTER — OUTPATIENT (OUTPATIENT)
Dept: OUTPATIENT SERVICES | Facility: HOSPITAL | Age: 72
LOS: 1 days | End: 2022-09-27
Payer: MEDICARE

## 2022-09-27 ENCOUNTER — APPOINTMENT (OUTPATIENT)
Dept: NUCLEAR MEDICINE | Facility: IMAGING CENTER | Age: 72
End: 2022-09-27

## 2022-09-27 ENCOUNTER — RESULT REVIEW (OUTPATIENT)
Age: 72
End: 2022-09-27

## 2022-09-27 DIAGNOSIS — R93.7 ABNORMAL FINDINGS ON DIAGNOSTIC IMAGING OF OTHER PARTS OF MUSCULOSKELETAL SYSTEM: ICD-10-CM

## 2022-09-27 DIAGNOSIS — Z00.8 ENCOUNTER FOR OTHER GENERAL EXAMINATION: ICD-10-CM

## 2022-09-27 DIAGNOSIS — M12.9 ARTHROPATHY, UNSPECIFIED: Chronic | ICD-10-CM

## 2022-09-27 DIAGNOSIS — R58 HEMORRHAGE, NOT ELSEWHERE CLASSIFIED: Chronic | ICD-10-CM

## 2022-09-27 DIAGNOSIS — Z98.890 OTHER SPECIFIED POSTPROCEDURAL STATES: Chronic | ICD-10-CM

## 2022-09-27 DIAGNOSIS — H26.9 UNSPECIFIED CATARACT: Chronic | ICD-10-CM

## 2022-09-27 DIAGNOSIS — C44.90 UNSPECIFIED MALIGNANT NEOPLASM OF SKIN, UNSPECIFIED: Chronic | ICD-10-CM

## 2022-09-27 PROCEDURE — 78306 BONE IMAGING WHOLE BODY: CPT | Mod: 26

## 2022-09-27 PROCEDURE — 78830 RP LOCLZJ TUM SPECT W/CT 1: CPT

## 2022-09-27 PROCEDURE — 78306 BONE IMAGING WHOLE BODY: CPT

## 2022-09-27 PROCEDURE — 78830 RP LOCLZJ TUM SPECT W/CT 1: CPT | Mod: 26

## 2022-09-27 PROCEDURE — A9561: CPT

## 2022-10-06 LAB
ALBUMIN MFR SERPL ELPH: 56.5 %
ALBUMIN SERPL-MCNC: 4.2 G/DL
ALBUMIN/GLOB SERPL: 1.3 RATIO
ALBUPE: 10.8 %
ALPHA1 GLOB MFR SERPL ELPH: 3.8 %
ALPHA1 GLOB SERPL ELPH-MCNC: 0.3 G/DL
ALPHA1UPE: 26.3 %
ALPHA2 GLOB MFR SERPL ELPH: 10.3 %
ALPHA2 GLOB SERPL ELPH-MCNC: 0.8 G/DL
ALPHA2UPE: 16.9 %
ANA PAT FLD IF-IMP: ABNORMAL
ANA SER IF-ACNC: ABNORMAL
B-GLOBULIN MFR SERPL ELPH: 13.1 %
B-GLOBULIN SERPL ELPH-MCNC: 1 G/DL
BETAUPE: 20.5 %
CREAT 24H UR-MCNC: NORMAL G/24 H
CREATININE UR (MAYO): 244 MG/DL
GAMMA GLOB FLD ELPH-MCNC: 1.2 G/DL
GAMMA GLOB MFR SERPL ELPH: 16.3 %
GAMMAUPE: 25.5 %
IGA 24H UR QL IFE: NORMAL
INTERPRETATION SERPL IEP-IMP: NORMAL
KAPPA LC 24H UR QL: NORMAL
M PROTEIN MFR SERPL ELPH: NORMAL
MONOCLON BAND OBS SERPL: NORMAL
PHOSPHATIDYETHANOL (PETH), WHOLE BLOOD: NEGATIVE NG/ML
PROT PATTERN 24H UR ELPH-IMP: NORMAL
PROT SERPL-MCNC: 7.5 G/DL
PROT SERPL-MCNC: 7.5 G/DL
PROT UR-MCNC: 77 MG/DL
PROT UR-MCNC: 77 MG/DL
SPECIMEN VOL 24H UR: NORMAL ML

## 2022-10-20 ENCOUNTER — APPOINTMENT (OUTPATIENT)
Dept: HEPATOLOGY | Facility: CLINIC | Age: 72
End: 2022-10-20

## 2022-10-20 DIAGNOSIS — Z23 ENCOUNTER FOR IMMUNIZATION: ICD-10-CM

## 2022-10-20 DIAGNOSIS — K74.60 UNSPECIFIED CIRRHOSIS OF LIVER: ICD-10-CM

## 2022-10-20 PROCEDURE — G0010: CPT

## 2022-10-20 PROCEDURE — 90739 HEPB VACC 2/4 DOSE ADULT IM: CPT

## 2022-10-26 ENCOUNTER — APPOINTMENT (OUTPATIENT)
Dept: GASTROENTEROLOGY | Facility: CLINIC | Age: 72
End: 2022-10-26

## 2022-10-26 VITALS
RESPIRATION RATE: 18 BRPM | BODY MASS INDEX: 30.52 KG/M2 | OXYGEN SATURATION: 98 % | SYSTOLIC BLOOD PRESSURE: 125 MMHG | DIASTOLIC BLOOD PRESSURE: 80 MMHG | HEIGHT: 71 IN | HEART RATE: 83 BPM | WEIGHT: 218 LBS | TEMPERATURE: 97 F

## 2022-10-26 DIAGNOSIS — R13.10 DYSPHAGIA, UNSPECIFIED: ICD-10-CM

## 2022-10-26 PROCEDURE — 99214 OFFICE O/P EST MOD 30 MIN: CPT

## 2022-10-26 NOTE — ASSESSMENT
[FreeTextEntry1] : 72 year old referred for possible EV, esophageal ring dilation, and gastric polyp.  Seen by liver already.  Pt with compensated cirrhosis.\par \par I discussed the proposed EUS (r/o varices), possible dilation, and possible gastric polyp removal with the patient.  I discussed the risks (bleeding, infection, perforation, pancreatitis, and anesthesia risks), benefits, and alternatives  with the patient. The patient agrees to proceed. \par \par Thank you for involving me in their care.\par \par Shane Keller MD\par  of Endoscopy, [facility]par Director of Endoscopy, [facility]\par , [facility]\par Associate Professor of Medicine\par [facility] [employer] at [facility] \par [facility]\par Phone: [phone]\par Fax: [phone]\par Email: [email or web address]\par \par \par

## 2022-10-26 NOTE — PHYSICAL EXAM
[Alert] : alert [Normal Voice/Communication] : normal voice/communication [Bowel Sounds] : normal bowel sounds [Abdomen Tenderness] : non-tender [Abdomen Soft] : soft [Oriented To Time, Place, And Person] : oriented to person, place, and time

## 2022-10-26 NOTE — CONSULT LETTER
[Dear  ___] : Dear  [unfilled], [Courtesy Letter:] : I had the pleasure of seeing your patient, [unfilled], in my office today. [Please see my note below.] : Please see my note below. [Sincerely,] : Sincerely, [FreeTextEntry2] : Denny Rodriguez MD\par [address] [facility]\[address] \[place] [address]\par Fax: [phone]\par Fax: [phone]\par \par  [FreeTextEntry3] : --\par Shane Keller MD\par  of Endoscopy, [facility]par Director of Endoscopy, [facility]\par , [facility]\par Associate Professor of Medicine\par [facility] [employer] at [facility] \par [facility]\par Phone: [phone]\par Fax: [phone]\par Email: [email or web address]\par

## 2022-10-26 NOTE — HISTORY OF PRESENT ILLNESS
[FreeTextEntry1] : 71 y/o male who states he is here after having an EGD and being noted to have a polyp in stomach, possible stricture and other issues of stomach.\par He states having dysphagia symptoms for the past few years. He mentions the dysphagia symptoms happen occasional which include liquids and solids. He states they feel like the get stuck in mid esophagus. \par Mentions being ETOH free for 7 years and being worked up for Cirrhosis by Dr Hill and was told he only has beginning signs with no concerning complications at the moment. \par \par Today he denies and other GI symptoms

## 2023-01-10 RX ORDER — SODIUM CHLORIDE 9 MG/ML
500 INJECTION, SOLUTION INTRAVENOUS
Refills: 0 | Status: DISCONTINUED | OUTPATIENT
Start: 2023-01-12 | End: 2023-01-26

## 2023-01-12 ENCOUNTER — OUTPATIENT (OUTPATIENT)
Dept: OUTPATIENT SERVICES | Facility: HOSPITAL | Age: 73
LOS: 1 days | Discharge: HOME | End: 2023-01-12
Payer: MEDICARE

## 2023-01-12 ENCOUNTER — RESULT REVIEW (OUTPATIENT)
Age: 73
End: 2023-01-12

## 2023-01-12 ENCOUNTER — APPOINTMENT (OUTPATIENT)
Dept: GASTROENTEROLOGY | Facility: HOSPITAL | Age: 73
End: 2023-01-12

## 2023-01-12 ENCOUNTER — TRANSCRIPTION ENCOUNTER (OUTPATIENT)
Age: 73
End: 2023-01-12

## 2023-01-12 VITALS
OXYGEN SATURATION: 97 % | HEART RATE: 58 BPM | SYSTOLIC BLOOD PRESSURE: 135 MMHG | HEIGHT: 71 IN | WEIGHT: 216.93 LBS | RESPIRATION RATE: 16 BRPM | TEMPERATURE: 98 F | DIASTOLIC BLOOD PRESSURE: 83 MMHG

## 2023-01-12 VITALS
DIASTOLIC BLOOD PRESSURE: 65 MMHG | SYSTOLIC BLOOD PRESSURE: 113 MMHG | OXYGEN SATURATION: 97 % | HEART RATE: 59 BPM | RESPIRATION RATE: 11 BRPM

## 2023-01-12 DIAGNOSIS — Z98.890 OTHER SPECIFIED POSTPROCEDURAL STATES: Chronic | ICD-10-CM

## 2023-01-12 DIAGNOSIS — C44.90 UNSPECIFIED MALIGNANT NEOPLASM OF SKIN, UNSPECIFIED: Chronic | ICD-10-CM

## 2023-01-12 DIAGNOSIS — M12.9 ARTHROPATHY, UNSPECIFIED: Chronic | ICD-10-CM

## 2023-01-12 DIAGNOSIS — H26.9 UNSPECIFIED CATARACT: Chronic | ICD-10-CM

## 2023-01-12 DIAGNOSIS — R58 HEMORRHAGE, NOT ELSEWHERE CLASSIFIED: Chronic | ICD-10-CM

## 2023-01-12 DIAGNOSIS — K31.7 POLYP OF STOMACH AND DUODENUM: ICD-10-CM

## 2023-01-12 PROCEDURE — 88305 TISSUE EXAM BY PATHOLOGIST: CPT | Mod: 26

## 2023-01-12 PROCEDURE — 43251 EGD REMOVE LESION SNARE: CPT | Mod: GC

## 2023-01-12 PROCEDURE — 43259 EGD US EXAM DUODENUM/JEJUNUM: CPT | Mod: GC

## 2023-01-12 DEVICE — CLIP RESOLUTION 360 235CM: Type: IMPLANTABLE DEVICE | Status: FUNCTIONAL

## 2023-01-12 DEVICE — IMPLANTABLE DEVICE: Type: IMPLANTABLE DEVICE | Status: FUNCTIONAL

## 2023-01-12 NOTE — ASU PATIENT PROFILE, ADULT - FALL HARM RISK - UNIVERSAL INTERVENTIONS
Bed in lowest position, wheels locked, appropriate side rails in place/Call bell, personal items and telephone in reach/Instruct patient to call for assistance before getting out of bed or chair/Non-slip footwear when patient is out of bed/[place] to call system/Physically safe environment - no spills, clutter or unnecessary equipment/Purposeful Proactive Rounding/Room/bathroom lighting operational, light cord in reach

## 2023-01-12 NOTE — ASU DISCHARGE PLAN (ADULT/PEDIATRIC) - NS MD DC FALL RISK RISK
For information on Fall & Injury Prevention, visit: [email or web address] OR  [email or web address] OR  https://www.cdc.gov/steadi/patient.html

## 2023-01-12 NOTE — PRE PROCEDURE NOTE - PRE PROCEDURE EVALUATION
Attending Physician:            VIJAY                Procedure: EGD/EUS/EMR/ possible dilation    Indication for Procedure: gastric polyp, rule out varices, dysphagia,   ________________________________________________________  PAST MEDICAL & SURGICAL HISTORY:  Hypertension      Hypercholesterolemia      GERD (gastroesophageal reflux disease)      Ventral hernia  December 2018      Anemia  &quot;had 2 tears in my small intestines which were cauterized approximately July 2018&#x27;      Prediabetes  diagnosed in 2017      Alcohol abuse  quit in 2015      Snoring  HUMBLE precautions -- responds affirmatively to STOP BANG questionnaire      Bleeding  with gastroscopy, repaired 2 tears in the &quot;small intestines&quot;      Cataract  b/l eye surgery with lens implants      Skin cancer  squamous cell on arm, leg, and hand -- had Moh&#x27;s surgery      Arthropathy  July 2016, right knee replacement      Arthropathy  Dec. 2016, left knee replacement      History of lymph node dissection of left axilla    Cirrhosis diagnosed 2022    ALLERGIES:  No Known Allergies    HOME MEDICATIONS:  atorvastatin: orally once a day  gabapentin 400 mg oral capsule: 1 cap(s) orally 3 times a day  hydroCHLOROthiazide 25 mg oral tablet: 1 tab(s) orally once a day  levothyroxine: orally once a day  metFORMIN 500 mg oral tablet: 1 tab(s) orally 2 times a day  omeprazole 40 mg oral delayed release capsule: 1 cap(s) orally once a day  sertraline 50 mg oral tablet: 1 tab(s) orally once a day  Trulicity Pen: subcutaneous once a week  valsartan 320 mg oral tablet: 1 tab(s) orally once a day    AICD/PPM: [ ] yes   [x ] no    PERTINENT LAB DATA:      see allscripts                PHYSICAL EXAMINATION:    Height (cm): 180.3  Weight (kg): 98.4  BMI (kg/m2): 30.3  BSA (m2): 2.18T(C): 36.5  HR: 58  BP: 135/83  RR: 16  SpO2: 97%    Constitutional: NAD  HEENT: PERRLA, EOMI,    Neck:  No JVD  Respiratory: CTAB/L  Cardiovascular: S1 and S2  Gastrointestinal: BS+, soft, NT/ND  Extremities: No peripheral edema  Neurological: A/O x 3, no focal deficits  Psychiatric: Normal mood, normal affect  Skin: No rashes    ASA Class: I [ ]  II [ ]  III [ x]  IV [ ]    COMMENTS:    The patient is a suitable candidate for the planned procedure unless box checked [ ]  No, explain:

## 2023-01-22 ENCOUNTER — NON-APPOINTMENT (OUTPATIENT)
Age: 73
End: 2023-01-22

## 2023-04-18 ENCOUNTER — APPOINTMENT (OUTPATIENT)
Dept: GASTROENTEROLOGY | Facility: CLINIC | Age: 73
End: 2023-04-18

## 2023-05-22 NOTE — ASU PATIENT PROFILE, ADULT - ACCEPTABLE
[Symptom and Test Evaluation] : symptom and test evaluation [Hyperlipidemia] : hyperlipidemia [Hypertension] : hypertension [Coronary Artery Disease] : coronary artery disease 5

## 2023-08-15 ENCOUNTER — INPATIENT (INPATIENT)
Facility: HOSPITAL | Age: 73
LOS: 0 days | Discharge: HOME | DRG: 603 | End: 2023-08-16
Attending: STUDENT IN AN ORGANIZED HEALTH CARE EDUCATION/TRAINING PROGRAM | Admitting: STUDENT IN AN ORGANIZED HEALTH CARE EDUCATION/TRAINING PROGRAM
Payer: MEDICARE

## 2023-08-15 VITALS
WEIGHT: 207.9 LBS | HEART RATE: 71 BPM | SYSTOLIC BLOOD PRESSURE: 121 MMHG | RESPIRATION RATE: 17 BRPM | HEIGHT: 71 IN | DIASTOLIC BLOOD PRESSURE: 78 MMHG | TEMPERATURE: 99 F | OXYGEN SATURATION: 96 %

## 2023-08-15 DIAGNOSIS — Z98.890 OTHER SPECIFIED POSTPROCEDURAL STATES: Chronic | ICD-10-CM

## 2023-08-15 DIAGNOSIS — C44.90 UNSPECIFIED MALIGNANT NEOPLASM OF SKIN, UNSPECIFIED: Chronic | ICD-10-CM

## 2023-08-15 DIAGNOSIS — M12.9 ARTHROPATHY, UNSPECIFIED: Chronic | ICD-10-CM

## 2023-08-15 DIAGNOSIS — R58 HEMORRHAGE, NOT ELSEWHERE CLASSIFIED: Chronic | ICD-10-CM

## 2023-08-15 DIAGNOSIS — L98.499 NON-PRESSURE CHRONIC ULCER OF SKIN OF OTHER SITES WITH UNSPECIFIED SEVERITY: ICD-10-CM

## 2023-08-15 DIAGNOSIS — H26.9 UNSPECIFIED CATARACT: Chronic | ICD-10-CM

## 2023-08-15 LAB
ALBUMIN SERPL ELPH-MCNC: 3.5 G/DL — SIGNIFICANT CHANGE UP (ref 3.5–5)
ALP SERPL-CCNC: 100 U/L — SIGNIFICANT CHANGE UP (ref 40–120)
ALT FLD-CCNC: 26 U/L DA — SIGNIFICANT CHANGE UP (ref 10–60)
ANION GAP SERPL CALC-SCNC: 3 MMOL/L — LOW (ref 5–17)
APPEARANCE UR: CLEAR — SIGNIFICANT CHANGE UP
APTT BLD: 32.3 SEC — SIGNIFICANT CHANGE UP (ref 24.5–35.6)
AST SERPL-CCNC: 22 U/L — SIGNIFICANT CHANGE UP (ref 10–40)
BASOPHILS # BLD AUTO: 0.04 K/UL — SIGNIFICANT CHANGE UP (ref 0–0.2)
BASOPHILS NFR BLD AUTO: 0.5 % — SIGNIFICANT CHANGE UP (ref 0–2)
BILIRUB SERPL-MCNC: 0.5 MG/DL — SIGNIFICANT CHANGE UP (ref 0.2–1.2)
BILIRUB UR-MCNC: NEGATIVE — SIGNIFICANT CHANGE UP
BUN SERPL-MCNC: 14 MG/DL — SIGNIFICANT CHANGE UP (ref 7–18)
CALCIUM SERPL-MCNC: 9 MG/DL — SIGNIFICANT CHANGE UP (ref 8.4–10.5)
CHLORIDE SERPL-SCNC: 106 MMOL/L — SIGNIFICANT CHANGE UP (ref 96–108)
CO2 SERPL-SCNC: 29 MMOL/L — SIGNIFICANT CHANGE UP (ref 22–31)
COLOR SPEC: YELLOW — SIGNIFICANT CHANGE UP
CREAT SERPL-MCNC: 1.12 MG/DL — SIGNIFICANT CHANGE UP (ref 0.5–1.3)
DIFF PNL FLD: NEGATIVE — SIGNIFICANT CHANGE UP
EGFR: 70 ML/MIN/1.73M2 — SIGNIFICANT CHANGE UP
EOSINOPHIL # BLD AUTO: 0.21 K/UL — SIGNIFICANT CHANGE UP (ref 0–0.5)
EOSINOPHIL NFR BLD AUTO: 2.5 % — SIGNIFICANT CHANGE UP (ref 0–6)
ERYTHROCYTE [SEDIMENTATION RATE] IN BLOOD: 28 MM/HR — HIGH (ref 0–20)
GLUCOSE SERPL-MCNC: 156 MG/DL — HIGH (ref 70–99)
GLUCOSE UR QL: NEGATIVE — SIGNIFICANT CHANGE UP
HCT VFR BLD CALC: 36 % — LOW (ref 39–50)
HGB BLD-MCNC: 11.7 G/DL — LOW (ref 13–17)
IMM GRANULOCYTES NFR BLD AUTO: 0.2 % — SIGNIFICANT CHANGE UP (ref 0–0.9)
INR BLD: 1.1 RATIO — SIGNIFICANT CHANGE UP (ref 0.85–1.18)
KETONES UR-MCNC: NEGATIVE — SIGNIFICANT CHANGE UP
LACTATE SERPL-SCNC: 1.3 MMOL/L — SIGNIFICANT CHANGE UP (ref 0.7–2)
LEUKOCYTE ESTERASE UR-ACNC: NEGATIVE — SIGNIFICANT CHANGE UP
LYMPHOCYTES # BLD AUTO: 1.72 K/UL — SIGNIFICANT CHANGE UP (ref 1–3.3)
LYMPHOCYTES # BLD AUTO: 20.7 % — SIGNIFICANT CHANGE UP (ref 13–44)
MCHC RBC-ENTMCNC: 27.5 PG — SIGNIFICANT CHANGE UP (ref 27–34)
MCHC RBC-ENTMCNC: 32.5 GM/DL — SIGNIFICANT CHANGE UP (ref 32–36)
MCV RBC AUTO: 84.7 FL — SIGNIFICANT CHANGE UP (ref 80–100)
MONOCYTES # BLD AUTO: 0.86 K/UL — SIGNIFICANT CHANGE UP (ref 0–0.9)
MONOCYTES NFR BLD AUTO: 10.4 % — SIGNIFICANT CHANGE UP (ref 2–14)
NEUTROPHILS # BLD AUTO: 5.45 K/UL — SIGNIFICANT CHANGE UP (ref 1.8–7.4)
NEUTROPHILS NFR BLD AUTO: 65.7 % — SIGNIFICANT CHANGE UP (ref 43–77)
NITRITE UR-MCNC: NEGATIVE — SIGNIFICANT CHANGE UP
NRBC # BLD: 0 /100 WBCS — SIGNIFICANT CHANGE UP (ref 0–0)
NT-PROBNP SERPL-SCNC: 111 PG/ML — SIGNIFICANT CHANGE UP (ref 0–125)
PH UR: 5 — SIGNIFICANT CHANGE UP (ref 5–8)
PLATELET # BLD AUTO: 162 K/UL — SIGNIFICANT CHANGE UP (ref 150–400)
POTASSIUM SERPL-MCNC: 3.4 MMOL/L — LOW (ref 3.5–5.3)
POTASSIUM SERPL-SCNC: 3.4 MMOL/L — LOW (ref 3.5–5.3)
PROT SERPL-MCNC: 7.5 G/DL — SIGNIFICANT CHANGE UP (ref 6–8.3)
PROT UR-MCNC: NEGATIVE — SIGNIFICANT CHANGE UP
PROTHROM AB SERPL-ACNC: 12.5 SEC — SIGNIFICANT CHANGE UP (ref 9.5–13)
RBC # BLD: 4.25 M/UL — SIGNIFICANT CHANGE UP (ref 4.2–5.8)
RBC # FLD: 14.4 % — SIGNIFICANT CHANGE UP (ref 10.3–14.5)
SODIUM SERPL-SCNC: 138 MMOL/L — SIGNIFICANT CHANGE UP (ref 135–145)
SP GR SPEC: 1.01 — SIGNIFICANT CHANGE UP (ref 1.01–1.02)
UROBILINOGEN FLD QL: NEGATIVE — SIGNIFICANT CHANGE UP
WBC # BLD: 8.3 K/UL — SIGNIFICANT CHANGE UP (ref 3.8–10.5)
WBC # FLD AUTO: 8.3 K/UL — SIGNIFICANT CHANGE UP (ref 3.8–10.5)

## 2023-08-15 PROCEDURE — 99285 EMERGENCY DEPT VISIT HI MDM: CPT

## 2023-08-15 PROCEDURE — 93923 UPR/LXTR ART STDY 3+ LVLS: CPT | Mod: 26

## 2023-08-15 PROCEDURE — 71045 X-RAY EXAM CHEST 1 VIEW: CPT | Mod: 26

## 2023-08-15 PROCEDURE — 73620 X-RAY EXAM OF FOOT: CPT | Mod: 26,RT

## 2023-08-15 PROCEDURE — 99222 1ST HOSP IP/OBS MODERATE 55: CPT

## 2023-08-15 RX ORDER — PIPERACILLIN AND TAZOBACTAM 4; .5 G/20ML; G/20ML
3.38 INJECTION, POWDER, LYOPHILIZED, FOR SOLUTION INTRAVENOUS ONCE
Refills: 0 | Status: COMPLETED | OUTPATIENT
Start: 2023-08-15 | End: 2023-08-15

## 2023-08-15 RX ORDER — VANCOMYCIN HCL 1 G
1000 VIAL (EA) INTRAVENOUS ONCE
Refills: 0 | Status: COMPLETED | OUTPATIENT
Start: 2023-08-15 | End: 2023-08-15

## 2023-08-15 RX ADMIN — PIPERACILLIN AND TAZOBACTAM 200 GRAM(S): 4; .5 INJECTION, POWDER, LYOPHILIZED, FOR SOLUTION INTRAVENOUS at 19:20

## 2023-08-15 RX ADMIN — Medication 250 MILLIGRAM(S): at 19:20

## 2023-08-15 NOTE — CONSULT NOTE ADULT - SUBJECTIVE AND OBJECTIVE BOX
Chief Complaint Quote	Pt sent by PCP for possible admission c/o pain, redness and swelling to Right 2nd toe.  Chief Complaint: 	Toe pain    Podiatry HPI: Patient stated he noticed this wound a couple days ago. Today he presented to his podiatrist office where they noted pus coming from the wound. Patient stated he also noticed swelling and redness. Patient states he is a diabetic with neuropathy and has had it for 20 years.     PMH:Hypertension    Hypercholesterolemia    GERD (gastroesophageal reflux disease)    Ventral hernia    Anemia    Prediabetes    Alcohol abuse    Snoring      Allergies: No Known Allergies    Medications:   FH:Family history of myocardial infarction (Father)      PSX: Bleeding    Cataract    Skin cancer    Arthropathy    Arthropathy    History of lymph node dissection of left axilla      SH:     Vital Signs Last 24 Hrs  T(C): 37 (15 Aug 2023 15:27), Max: 37 (15 Aug 2023 15:27)  T(F): 98.6 (15 Aug 2023 15:27), Max: 98.6 (15 Aug 2023 15:27)  HR: 71 (15 Aug 2023 15:27) (71 - 71)  BP: 121/78 (15 Aug 2023 15:27) (121/78 - 121/78)  BP(mean): --  RR: 17 (15 Aug 2023 15:27) (17 - 17)  SpO2: 96% (15 Aug 2023 15:27) (96% - 96%)    Parameters below as of 15 Aug 2023 15:27  Patient On (Oxygen Delivery Method): room air        LABS                      ROS  REVIEW OF SYSTEMS:    CONSTITUTIONAL: No fever, weight loss, or fatigue  EYES: No eye pain, visual disturbances, or discharge  ENMT:  No difficulty hearing, tinnitus, vertigo; No sinus or throat pain  NECK: No pain or stiffness  BREASTS: No pain, masses, or nipple discharge  RESPIRATORY: No cough, wheezing, chills or hemoptysis; No shortness of breath  CARDIOVASCULAR: No chest pain, palpitations, dizziness, or leg swelling  GASTROINTESTINAL: No abdominal or epigastric pain. No nausea, vomiting, or hematemesis; No diarrhea or constipation. No melena or hematochezia.  GENITOURINARY: No dysuria, frequency, hematuria, or incontinence  NEUROLOGICAL: No headaches, memory loss, loss of strength, numbness, or tremors  SKIN: No itching, burning, rashes, or lesions   LYMPH NODES: No enlarged glands  ENDOCRINE: No heat or cold intolerance; No hair loss  MUSCULOSKELETAL: No joint pain or swelling; No muscle, back, or extremity pain  PSYCHIATRIC: No depression, anxiety, mood swings, or difficulty sleeping  HEME/LYMPH: No easy bruising, or bleeding gums  ALLERY AND IMMUNOLOGIC: No hives or eczema      PHYSICAL EXAM  LE Focused:  RLE focused exam  Vasc:  DP and PT pulses palpable 2/4. CFT<3 seconds. TG warm to warm with no increase in warmth to second toe. Pedal hair present  Derm: Edema noted to second digit. Erythema noted in second digit about 2.5 cm dorsally over the forefoot. Distal tuft wound noted on second digit traveling laterally. Wound measures 3 cm x 1.5 cm x 0.2 cm. No PTB. No streaking/fluctuance/crepitus noted.   Neuro: Gross sensation diminished.  MSK: Patient was ambulating without assistance. Patient able to wiggle toes. Mild POP to second digit distal tuft.      IMAGING:  Xray: final read pending    CULTURES: pending

## 2023-08-15 NOTE — H&P ADULT - PROBLEM SELECTOR PLAN 1
juanito and zosyn   ID p/w Rigth second toe cellulitis, ESR H   s/p I&D in podiatrist office and ED  x-ray foot   start vanc and zosyn   f/u Bcux   ID consult   podiatry following p/w Rigth second toe cellulitis, ESR H   s/p I&D in podiatrist office and ED  x-ray foot   start vanc and zosyn   f/u Bcux   ID consult Dr. Snow  podiatry following

## 2023-08-15 NOTE — ED PROVIDER NOTE - CLINICAL SUMMARY MEDICAL DECISION MAKING FREE TEXT BOX
pt sent in by Podiatry with infected diabetic ulcer to his toe, will get labs., x-ray, also give Abx. Podiatry consult

## 2023-08-15 NOTE — H&P ADULT - NSHPPHYSICALEXAM_GEN_ALL_CORE
GENERAL: NAD   HEAD:  Atraumatic, Normocephalic  EYES:  conjunctiva and sclera clear  NECK: Supple, No JVD, Normal thyroid  CHEST/LUNG: Clear to auscultation. Clear to percussion bilaterally; No rales, rhonchi, wheezing, or rubs  HEART: Regular rate and rhythm; No murmurs, rubs, or gallops  ABDOMEN: Soft, Nontender, Nondistended; Bowel sounds present  NERVOUS SYSTEM:  Alert & Oriented X3,    EXTREMITIES:  2+ Peripheral Pulses, No clubbing, cyanosis, or edema, Edema and erythema  noted to second digit with wound   Neuro: Gross sensation diminished.  SKIN: warm dry

## 2023-08-15 NOTE — H&P ADULT - PROBLEM SELECTOR PLAN 5
hx DM on metformin 500 BID, and gabpentin 400 TID for neuropathy   c/w gabapentin   start sliding scale  f/u A1C

## 2023-08-15 NOTE — ED PROVIDER NOTE - OBJECTIVE STATEMENT
73-year-old male with history of prediabetes, last dose this morning.  Patient also with history of neuropathy.  Patient states on Sunday he noted there was skin on his right second toe, patient ripped the skin off and with bleeding.  Monday a.m. he woke up with swelling, pain to his right second toe.  Patient went to podiatry today noted that his purulent discharge and sent to ED.  Patient denies fever

## 2023-08-15 NOTE — H&P ADULT - HISTORY OF PRESENT ILLNESS
This 72 y/o male from home pmhx of DM with neuropathy, HLD, HTN, and depression presenting to the ED for toe swelling. Patient states on Sunday he picked on 'extra piece of skin" on right second toe which started bleeding. The following day he noticed swelling and tenderness of toe. He anny to his podiatrist Dr. Gaona who performed I&D in office.  This 72 y/o male from home pmhx of DM with neuropathy, HLD, HTN, and depression presenting to the ED for toe swelling. Patient states on Sunday he picked on 'extra piece of skin" on right second toe which started bleeding. The following day he noticed swelling and tenderness of toe. He anny to his podiatrist Dr. Gaona who performed I&D in office and was told to come to ED. in ED he was seen by podiatry minimal pus drained. Denies any fevers, chills, shortness of breath, abdominal pian, N/V/D.     In ED:   Vitals: BP: 119/66, HR: 66, 96% ON RA.   s/p zosyn and vanc   x-ray foot

## 2023-08-15 NOTE — ED PROVIDER NOTE - PROGRESS NOTE DETAILS
Labs/x-rays explained to pt & wife  pt was seen Podiatrist, advised to admit pt  case d/w Dr. Headley

## 2023-08-15 NOTE — H&P ADULT - ATTENDING COMMENTS
HPI  73 year old male patient with pmhx DM with diabetic neuropathy (for 20 years), Hypothyroidism, HTN, HLD, GERD sent to the ER by his doctor after pus was noted draining from his foot wound in the podiatry office.    A/P  # Diabetic Foot Ulcer  # Purulent Cellulitis  Patient states pus was removed and cultured by podiatry at their office  - IV Vancomycin and Zosyn  - Podiatry consulted  - Betadine and Dry Sterile Dressing  - Follow up Wound Culture of pus obtained by Podiatry    # Hx of DM  - Insulin Sliding Scale  - Can start 9 units Long Acting Insulin daily  - A1C    # Hx of Hypothyroidism  - Continue home Levothyroxine    # Hx of HTN  - Continue home antihypertensive medications    # Hx of HLD  - Continue home statin    # DVT PPx  - Lovenox    # FEN  - Monitor and replete electrolytes  - Diabetic diet HPI  73 year old male patient with pmhx DM with diabetic neuropathy (for 20 years), Hypothyroidism, HTN, HLD, GERD sent to the ER by his doctor after pus was noted draining from his foot wound in the podiatry office.    A/P  # Diabetic Foot Ulcer  # Purulent Cellulitis  Patient states pus was removed and cultured by podiatry at their office  - IV Vancomycin and Zosyn  - Podiatry consulted  - Betadine and Dry Sterile Dressing  - Follow up Wound Culture of pus obtained by Podiatry    # Hx of DM  - Insulin Sliding Scale  - Patient is insulin naive and on Metformin at home; can check blood sugars for 24 hours and if not at goal < 180 mg/dL can start low dose Long Acting Insulin daily  - A1C    # Hx of Hypothyroidism  - Continue home Levothyroxine    # Hx of HTN  - Continue home antihypertensive medications    # Hx of HLD  - Continue home statin    # DVT PPx  - Lovenox    # FEN  - Monitor and replete electrolytes  - Diabetic diet HPI  73 year old male patient with pmhx DM with diabetic neuropathy (for 20 years), Hypothyroidism, HTN, HLD, GERD sent to the ER by his doctor after pus was noted draining from his foot wound in the podiatry office.    Physical Exam  General: Awake, Alert, and Fully Oriented  Cardiac: RRR  Pulmonary: CTA b/l  Abdomen: Soft, ND, NT    A/P  # Diabetic Foot Ulcer  # Purulent Cellulitis  Patient states pus was removed and cultured by podiatry at their office  - IV Vancomycin and Zosyn  - Podiatry consulted  - Betadine and Dry Sterile Dressing  - Follow up Wound Culture of pus obtained by Podiatry    # Hx of DM  - Insulin Sliding Scale  - Patient is insulin naive and on Metformin at home; can check blood sugars for 24 hours and if not at goal < 180 mg/dL can start low dose Long Acting Insulin daily  - A1C    # Hx of Hypothyroidism  - Continue home Levothyroxine    # Hx of HTN  - Continue home antihypertensive medications    # Hx of HLD  - Continue home statin    # DVT PPx  - Lovenox    # FEN  - Monitor and replete electrolytes  - Diabetic diet HPI  73 year old male patient with pmhx DM with diabetic neuropathy (for 20 years), Hypothyroidism, HTN, HLD, GERD sent to the ER by his doctor after pus was noted draining from his foot wound in the podiatry office.    Physical Exam  General: Awake, Alert, and Fully Oriented  Cardiac: RRR  Pulmonary: CTA b/l  Abdomen: Soft, ND, NT  Extremities: Left 2nd toe with ulcer at bottom tip with swelling and erythema when unwrapped    A/P  # Diabetic Foot Ulcer  # Purulent Cellulitis  Patient states pus was removed and cultured by podiatry at their office  - IV Vancomycin and Zosyn  - Podiatry consulted  - Betadine and Dry Sterile Dressing  - Follow up Wound Culture of pus obtained by Podiatry    # Hx of DM  - Insulin Sliding Scale  - Patient is insulin naive and on Metformin at home; can check blood sugars for 24 hours and if not at goal < 180 mg/dL can start low dose Long Acting Insulin daily  - A1C    # Hx of Hypothyroidism  - Continue home Levothyroxine    # Hx of HTN  - Continue home antihypertensive medications    # Hx of HLD  - Continue home statin    # DVT PPx  - Lovenox    # FEN  - Monitor and replete electrolytes  - Diabetic diet HPI  73 year old male patient with pmhx DM with diabetic neuropathy (for 20 years), Hypothyroidism, HTN, HLD, GERD sent to the ER by his doctor after pus was noted draining from his foot wound in the podiatry office.    Physical Exam  General: Awake, Alert, and Fully Oriented  Cardiac: RRR  Pulmonary: CTA b/l  Abdomen: Soft, ND, NT  Extremities: Right 2nd toe with ulcer at bottom tip with swelling and erythema when unwrapped    A/P  # Diabetic Foot Ulcer  # Purulent Cellulitis  Patient states pus was removed and cultured by podiatry at their office  - IV Vancomycin and Zosyn  - Podiatry consulted  - Betadine and Dry Sterile Dressing  - Follow up Wound Culture of pus obtained by Podiatry    # Hx of DM  - Insulin Sliding Scale  - Patient is insulin naive and on Metformin at home; can check blood sugars for 24 hours and if not at goal < 180 mg/dL can start low dose Long Acting Insulin daily  - A1C    # Hx of Hypothyroidism  - Continue home Levothyroxine    # Hx of HTN  - Continue home antihypertensive medications    # Hx of HLD  - Continue home statin    # DVT PPx  - Lovenox    # FEN  - Monitor and replete electrolytes  - Diabetic diet

## 2023-08-15 NOTE — CONSULT NOTE ADULT - ASSESSMENT
A:  Diabetic ulcer 2nd toe    P:   Patient evaluated and Chart reviewed   Discussed diagnosis and treatment with patient  Labs pending  X-rays taken: final read pending  Obtained wound culture to be sent to Lab  Applied betadine with dry sterile dressing  Continue with IV antibiotics As Per ED  Pt to be admitted to be monitored for cellulitis and diabetic ulcer. Upon lab results and antibiotics, pt to be discharged on oral antibiotics.  Weight bearing as tolerated to right foot  Offloading to bilateral Heels.   Discussed importance of daily foot examinations and proper shoe gear and to importance of lower Fasting Blood Glucose levels.   Podiatry to follow while in house.  Discussed with Attending ------

## 2023-08-15 NOTE — H&P ADULT - ASSESSMENT
This 74 y/o male from home pmhx of DM with neuropathy, HLD, HTN, and depression presenting to the ED for toe swelling. Patient states on Sunday he picked on 'extra piece of skin" on right second toe which started bleeding. The following day he noticed swelling and tenderness of toe. He anny to his podiatrist Dr. Gaona who performed I&D in office and was told to come to ED.  Admitted for cellulitis       In ED:   Vitals: BP: 119/66, HR: 66, 96% ON RA.   s/p zosyn and vanc   x-ray foot  This 72 y/o male from home pmhx of DM with neuropathy, HLD, HTN, and depression presenting to the ED for toe swelling. Patient states on Sunday he picked on 'extra piece of skin" on right second toe which started bleeding. The following day he noticed swelling and tenderness of toe. He anny to his podiatrist Dr. Gaona who performed I&D in office and was told to come to ED.  Admitted for cellulitis       In ED:   Vitals: BP: 119/66, HR: 66, 96% ON RA.   s/p zosyn and vanc   x-ray foot     MED REC IN AM FOR LEVOTHYROXINE DOSE

## 2023-08-15 NOTE — ED PROVIDER NOTE - BIRTH SEX
Raza Contreras)  Surgery  [address], [address]  [place] [address]  Phone: [phone]  Fax: [phone]  Follow Up Time:     Slim Mcnamara)  Surgery  [address], [address]  [place] [address]  Phone: [phone]  Fax: [phone]  Follow Up Time:     Jimmy Cuellar)  Surgery  [address] [place] [address]  Phone: [phone]  Fax: [phone]  Follow Up Time:
Male

## 2023-08-16 ENCOUNTER — TRANSCRIPTION ENCOUNTER (OUTPATIENT)
Age: 73
End: 2023-08-16

## 2023-08-16 VITALS
DIASTOLIC BLOOD PRESSURE: 72 MMHG | HEART RATE: 57 BPM | TEMPERATURE: 98 F | RESPIRATION RATE: 17 BRPM | SYSTOLIC BLOOD PRESSURE: 121 MMHG | OXYGEN SATURATION: 97 %

## 2023-08-16 DIAGNOSIS — L03.90 CELLULITIS, UNSPECIFIED: ICD-10-CM

## 2023-08-16 DIAGNOSIS — Z29.9 ENCOUNTER FOR PROPHYLACTIC MEASURES, UNSPECIFIED: ICD-10-CM

## 2023-08-16 DIAGNOSIS — E87.6 HYPOKALEMIA: ICD-10-CM

## 2023-08-16 DIAGNOSIS — I10 ESSENTIAL (PRIMARY) HYPERTENSION: ICD-10-CM

## 2023-08-16 DIAGNOSIS — E78.5 HYPERLIPIDEMIA, UNSPECIFIED: ICD-10-CM

## 2023-08-16 DIAGNOSIS — E11.9 TYPE 2 DIABETES MELLITUS WITHOUT COMPLICATIONS: ICD-10-CM

## 2023-08-16 DIAGNOSIS — E03.9 HYPOTHYROIDISM, UNSPECIFIED: ICD-10-CM

## 2023-08-16 LAB
ANION GAP SERPL CALC-SCNC: 7 MMOL/L — SIGNIFICANT CHANGE UP (ref 5–17)
BASOPHILS # BLD AUTO: 0.03 K/UL — SIGNIFICANT CHANGE UP (ref 0–0.2)
BASOPHILS NFR BLD AUTO: 0.4 % — SIGNIFICANT CHANGE UP (ref 0–2)
BUN SERPL-MCNC: 14 MG/DL — SIGNIFICANT CHANGE UP (ref 7–18)
CALCIUM SERPL-MCNC: 8.6 MG/DL — SIGNIFICANT CHANGE UP (ref 8.4–10.5)
CHLORIDE SERPL-SCNC: 105 MMOL/L — SIGNIFICANT CHANGE UP (ref 96–108)
CO2 SERPL-SCNC: 28 MMOL/L — SIGNIFICANT CHANGE UP (ref 22–31)
CREAT SERPL-MCNC: 1.11 MG/DL — SIGNIFICANT CHANGE UP (ref 0.5–1.3)
CRP SERPL-MCNC: 27 MG/L — HIGH
CULTURE RESULTS: NO GROWTH — SIGNIFICANT CHANGE UP
EGFR: 71 ML/MIN/1.73M2 — SIGNIFICANT CHANGE UP
EOSINOPHIL # BLD AUTO: 0.28 K/UL — SIGNIFICANT CHANGE UP (ref 0–0.5)
EOSINOPHIL NFR BLD AUTO: 4 % — SIGNIFICANT CHANGE UP (ref 0–6)
GLUCOSE BLDC GLUCOMTR-MCNC: 122 MG/DL — HIGH (ref 70–99)
GLUCOSE BLDC GLUCOMTR-MCNC: 137 MG/DL — HIGH (ref 70–99)
GLUCOSE BLDC GLUCOMTR-MCNC: 213 MG/DL — HIGH (ref 70–99)
GLUCOSE SERPL-MCNC: 144 MG/DL — HIGH (ref 70–99)
HCT VFR BLD CALC: 35.3 % — LOW (ref 39–50)
HCV AB S/CO SERPL IA: 0.1 S/CO — SIGNIFICANT CHANGE UP (ref 0–0.99)
HCV AB SERPL-IMP: SIGNIFICANT CHANGE UP
HGB BLD-MCNC: 11.4 G/DL — LOW (ref 13–17)
IMM GRANULOCYTES NFR BLD AUTO: 0.4 % — SIGNIFICANT CHANGE UP (ref 0–0.9)
LYMPHOCYTES # BLD AUTO: 1.36 K/UL — SIGNIFICANT CHANGE UP (ref 1–3.3)
LYMPHOCYTES # BLD AUTO: 19.5 % — SIGNIFICANT CHANGE UP (ref 13–44)
MAGNESIUM SERPL-MCNC: 1.8 MG/DL — SIGNIFICANT CHANGE UP (ref 1.6–2.6)
MCHC RBC-ENTMCNC: 27.2 PG — SIGNIFICANT CHANGE UP (ref 27–34)
MCHC RBC-ENTMCNC: 32.3 GM/DL — SIGNIFICANT CHANGE UP (ref 32–36)
MCV RBC AUTO: 84.2 FL — SIGNIFICANT CHANGE UP (ref 80–100)
MONOCYTES # BLD AUTO: 0.66 K/UL — SIGNIFICANT CHANGE UP (ref 0–0.9)
MONOCYTES NFR BLD AUTO: 9.5 % — SIGNIFICANT CHANGE UP (ref 2–14)
NEUTROPHILS # BLD AUTO: 4.61 K/UL — SIGNIFICANT CHANGE UP (ref 1.8–7.4)
NEUTROPHILS NFR BLD AUTO: 66.2 % — SIGNIFICANT CHANGE UP (ref 43–77)
NRBC # BLD: 0 /100 WBCS — SIGNIFICANT CHANGE UP (ref 0–0)
PHOSPHATE SERPL-MCNC: 2.7 MG/DL — SIGNIFICANT CHANGE UP (ref 2.5–4.5)
PLATELET # BLD AUTO: 146 K/UL — LOW (ref 150–400)
POTASSIUM SERPL-MCNC: 3.7 MMOL/L — SIGNIFICANT CHANGE UP (ref 3.5–5.3)
POTASSIUM SERPL-SCNC: 3.7 MMOL/L — SIGNIFICANT CHANGE UP (ref 3.5–5.3)
RBC # BLD: 4.19 M/UL — LOW (ref 4.2–5.8)
RBC # FLD: 14.2 % — SIGNIFICANT CHANGE UP (ref 10.3–14.5)
SODIUM SERPL-SCNC: 140 MMOL/L — SIGNIFICANT CHANGE UP (ref 135–145)
SPECIMEN SOURCE: SIGNIFICANT CHANGE UP
WBC # BLD: 6.97 K/UL — SIGNIFICANT CHANGE UP (ref 3.8–10.5)
WBC # FLD AUTO: 6.97 K/UL — SIGNIFICANT CHANGE UP (ref 3.8–10.5)

## 2023-08-16 PROCEDURE — 73620 X-RAY EXAM OF FOOT: CPT

## 2023-08-16 PROCEDURE — 84100 ASSAY OF PHOSPHORUS: CPT

## 2023-08-16 PROCEDURE — 86803 HEPATITIS C AB TEST: CPT

## 2023-08-16 PROCEDURE — 99239 HOSP IP/OBS DSCHRG MGMT >30: CPT

## 2023-08-16 PROCEDURE — 85652 RBC SED RATE AUTOMATED: CPT

## 2023-08-16 PROCEDURE — 85025 COMPLETE CBC W/AUTO DIFF WBC: CPT

## 2023-08-16 PROCEDURE — 85730 THROMBOPLASTIN TIME PARTIAL: CPT

## 2023-08-16 PROCEDURE — 93923 UPR/LXTR ART STDY 3+ LVLS: CPT

## 2023-08-16 PROCEDURE — 86140 C-REACTIVE PROTEIN: CPT

## 2023-08-16 PROCEDURE — 71045 X-RAY EXAM CHEST 1 VIEW: CPT

## 2023-08-16 PROCEDURE — 82962 GLUCOSE BLOOD TEST: CPT

## 2023-08-16 PROCEDURE — 96374 THER/PROPH/DIAG INJ IV PUSH: CPT

## 2023-08-16 PROCEDURE — 83735 ASSAY OF MAGNESIUM: CPT

## 2023-08-16 PROCEDURE — 83880 ASSAY OF NATRIURETIC PEPTIDE: CPT

## 2023-08-16 PROCEDURE — 87040 BLOOD CULTURE FOR BACTERIA: CPT

## 2023-08-16 PROCEDURE — 99285 EMERGENCY DEPT VISIT HI MDM: CPT | Mod: 25

## 2023-08-16 PROCEDURE — 85610 PROTHROMBIN TIME: CPT

## 2023-08-16 PROCEDURE — 96375 TX/PRO/DX INJ NEW DRUG ADDON: CPT

## 2023-08-16 PROCEDURE — 83605 ASSAY OF LACTIC ACID: CPT

## 2023-08-16 PROCEDURE — 80053 COMPREHEN METABOLIC PANEL: CPT

## 2023-08-16 PROCEDURE — 80048 BASIC METABOLIC PNL TOTAL CA: CPT

## 2023-08-16 PROCEDURE — 36415 COLL VENOUS BLD VENIPUNCTURE: CPT

## 2023-08-16 PROCEDURE — 99222 1ST HOSP IP/OBS MODERATE 55: CPT

## 2023-08-16 PROCEDURE — 81003 URINALYSIS AUTO W/O SCOPE: CPT

## 2023-08-16 PROCEDURE — 87086 URINE CULTURE/COLONY COUNT: CPT

## 2023-08-16 RX ORDER — LEVOTHYROXINE SODIUM 125 MCG
0 TABLET ORAL
Qty: 0 | Refills: 0 | DISCHARGE

## 2023-08-16 RX ORDER — VANCOMYCIN HCL 1 G
1500 VIAL (EA) INTRAVENOUS EVERY 12 HOURS
Refills: 0 | Status: DISCONTINUED | OUTPATIENT
Start: 2023-08-16 | End: 2023-08-16

## 2023-08-16 RX ORDER — AMPICILLIN SODIUM AND SULBACTAM SODIUM 250; 125 MG/ML; MG/ML
INJECTION, POWDER, FOR SUSPENSION INTRAMUSCULAR; INTRAVENOUS
Refills: 0 | Status: DISCONTINUED | OUTPATIENT
Start: 2023-08-16 | End: 2023-08-16

## 2023-08-16 RX ORDER — ATORVASTATIN CALCIUM 80 MG/1
0 TABLET, FILM COATED ORAL
Qty: 0 | Refills: 0 | DISCHARGE

## 2023-08-16 RX ORDER — CEFAZOLIN SODIUM 1 G
1000 VIAL (EA) INJECTION EVERY 8 HOURS
Refills: 0 | Status: DISCONTINUED | OUTPATIENT
Start: 2023-08-16 | End: 2023-08-16

## 2023-08-16 RX ORDER — ENOXAPARIN SODIUM 100 MG/ML
40 INJECTION SUBCUTANEOUS EVERY 24 HOURS
Refills: 0 | Status: DISCONTINUED | OUTPATIENT
Start: 2023-08-16 | End: 2023-08-16

## 2023-08-16 RX ORDER — ATORVASTATIN CALCIUM 80 MG/1
10 TABLET, FILM COATED ORAL AT BEDTIME
Refills: 0 | Status: DISCONTINUED | OUTPATIENT
Start: 2023-08-16 | End: 2023-08-16

## 2023-08-16 RX ORDER — GABAPENTIN 400 MG/1
400 CAPSULE ORAL THREE TIMES A DAY
Refills: 0 | Status: DISCONTINUED | OUTPATIENT
Start: 2023-08-16 | End: 2023-08-16

## 2023-08-16 RX ORDER — DULAGLUTIDE 4.5 MG/.5ML
0 INJECTION, SOLUTION SUBCUTANEOUS
Qty: 0 | Refills: 0 | DISCHARGE

## 2023-08-16 RX ORDER — AMPICILLIN SODIUM AND SULBACTAM SODIUM 250; 125 MG/ML; MG/ML
3 INJECTION, POWDER, FOR SUSPENSION INTRAMUSCULAR; INTRAVENOUS ONCE
Refills: 0 | Status: COMPLETED | OUTPATIENT
Start: 2023-08-16 | End: 2023-08-16

## 2023-08-16 RX ORDER — PIPERACILLIN AND TAZOBACTAM 4; .5 G/20ML; G/20ML
3.38 INJECTION, POWDER, LYOPHILIZED, FOR SOLUTION INTRAVENOUS ONCE
Refills: 0 | Status: COMPLETED | OUTPATIENT
Start: 2023-08-16 | End: 2023-08-16

## 2023-08-16 RX ORDER — VALSARTAN 80 MG/1
320 TABLET ORAL DAILY
Refills: 0 | Status: DISCONTINUED | OUTPATIENT
Start: 2023-08-16 | End: 2023-08-16

## 2023-08-16 RX ORDER — SERTRALINE 25 MG/1
50 TABLET, FILM COATED ORAL DAILY
Refills: 0 | Status: DISCONTINUED | OUTPATIENT
Start: 2023-08-16 | End: 2023-08-16

## 2023-08-16 RX ORDER — POTASSIUM CHLORIDE 20 MEQ
40 PACKET (EA) ORAL ONCE
Refills: 0 | Status: COMPLETED | OUTPATIENT
Start: 2023-08-16 | End: 2023-08-16

## 2023-08-16 RX ORDER — INSULIN LISPRO 100/ML
VIAL (ML) SUBCUTANEOUS
Refills: 0 | Status: DISCONTINUED | OUTPATIENT
Start: 2023-08-16 | End: 2023-08-16

## 2023-08-16 RX ORDER — LEVOTHYROXINE SODIUM 125 MCG
50 TABLET ORAL DAILY
Refills: 0 | Status: DISCONTINUED | OUTPATIENT
Start: 2023-08-16 | End: 2023-08-16

## 2023-08-16 RX ORDER — PANTOPRAZOLE SODIUM 20 MG/1
40 TABLET, DELAYED RELEASE ORAL
Refills: 0 | Status: DISCONTINUED | OUTPATIENT
Start: 2023-08-16 | End: 2023-08-16

## 2023-08-16 RX ORDER — ACETAMINOPHEN 500 MG
650 TABLET ORAL ONCE
Refills: 0 | Status: COMPLETED | OUTPATIENT
Start: 2023-08-16 | End: 2023-08-16

## 2023-08-16 RX ORDER — AMPICILLIN SODIUM AND SULBACTAM SODIUM 250; 125 MG/ML; MG/ML
3 INJECTION, POWDER, FOR SUSPENSION INTRAMUSCULAR; INTRAVENOUS EVERY 6 HOURS
Refills: 0 | Status: DISCONTINUED | OUTPATIENT
Start: 2023-08-16 | End: 2023-08-16

## 2023-08-16 RX ORDER — PIPERACILLIN AND TAZOBACTAM 4; .5 G/20ML; G/20ML
3.38 INJECTION, POWDER, LYOPHILIZED, FOR SOLUTION INTRAVENOUS EVERY 8 HOURS
Refills: 0 | Status: DISCONTINUED | OUTPATIENT
Start: 2023-08-16 | End: 2023-08-16

## 2023-08-16 RX ADMIN — Medication 2: at 08:10

## 2023-08-16 RX ADMIN — GABAPENTIN 400 MILLIGRAM(S): 400 CAPSULE ORAL at 05:07

## 2023-08-16 RX ADMIN — PIPERACILLIN AND TAZOBACTAM 200 GRAM(S): 4; .5 INJECTION, POWDER, LYOPHILIZED, FOR SOLUTION INTRAVENOUS at 05:09

## 2023-08-16 RX ADMIN — AMPICILLIN SODIUM AND SULBACTAM SODIUM 200 GRAM(S): 250; 125 INJECTION, POWDER, FOR SUSPENSION INTRAMUSCULAR; INTRAVENOUS at 18:21

## 2023-08-16 RX ADMIN — Medication 40 MILLIEQUIVALENT(S): at 05:08

## 2023-08-16 RX ADMIN — PIPERACILLIN AND TAZOBACTAM 25 GRAM(S): 4; .5 INJECTION, POWDER, LYOPHILIZED, FOR SOLUTION INTRAVENOUS at 13:40

## 2023-08-16 RX ADMIN — GABAPENTIN 400 MILLIGRAM(S): 400 CAPSULE ORAL at 13:41

## 2023-08-16 RX ADMIN — ENOXAPARIN SODIUM 40 MILLIGRAM(S): 100 INJECTION SUBCUTANEOUS at 05:08

## 2023-08-16 RX ADMIN — SERTRALINE 50 MILLIGRAM(S): 25 TABLET, FILM COATED ORAL at 13:39

## 2023-08-16 RX ADMIN — Medication 300 MILLIGRAM(S): at 06:35

## 2023-08-16 RX ADMIN — VALSARTAN 320 MILLIGRAM(S): 80 TABLET ORAL at 05:08

## 2023-08-16 RX ADMIN — PANTOPRAZOLE SODIUM 40 MILLIGRAM(S): 20 TABLET, DELAYED RELEASE ORAL at 05:10

## 2023-08-16 NOTE — PHARMACOTHERAPY INTERVENTION NOTE - COMMENTS
Patient was interviewed at bedside. Patient's allergies and home pharmacy were confirmed with the patient, no changes. (Added to the patient's Outside Medication Review report)

## 2023-08-16 NOTE — PROGRESS NOTE ADULT - PROBLEM SELECTOR PLAN 1
p/w Right second toe cellulitis, ESR H   s/p I&D in podiatrist office and ED  x-ray foot noted for degeneration of first MTP  c/w vanc and zosyn until ID's input  f/u Bcux   ID consult Dr. Snow  podiatry following

## 2023-08-16 NOTE — CONSULT NOTE ADULT - ATTENDING COMMENTS
72 yo male with h/o DM with neuropathy, HTN, HLD, depression who came to the ED with R 2nd toe wound and swelling after injury ~ 5 days ago, he went to see his podiatry who referred him for evaluation.   Afebrile, non toxic, no significant labs abnormalities, the wound is superficial with neg PTB  Sedimentation Rate, Erythrocyte: 28 mm/Hr *H* (08-15-23 @ 17:50)  C-Reactive Protein, Serum: 27 mg/L *H* (08-15-23 @ 17:50)    -Diabetic foot ulcer R 2nd toe with mild surround cellulitis  -R 2nd toe cellulitis   -DM    Please d/c vancomycin and zosyn  Start Unasyn 3g q6 hrs IV and once ready for d/c can transition to Augmentin 875/125 mg q12 hrs PO for 7-10 days total  Diabetic foot care  Wound care  DM control  Discussed with Dr. Campuzano    Please reach ID with any questions or concerns  Dr. Deneen Preciado  Available in Teams

## 2023-08-16 NOTE — PROGRESS NOTE ADULT - PROBLEM SELECTOR PLAN 5
hx DM on metformin 500 BID, and gabapentin 400 TID for neuropathy   c/w gabapentin   c/w sliding scale  f/u A1C

## 2023-08-16 NOTE — DISCHARGE NOTE NURSING/CASE MANAGEMENT/SOCIAL WORK - NSDCPEFALRISK_GEN_ALL_CORE
For information on Fall & Injury Prevention, visit: [email or web address] OR  [email or web address] OR  https://www.cdc.gov/steadi/patient.html

## 2023-08-16 NOTE — PROGRESS NOTE ADULT - ASSESSMENT
This 72 y/o male from home pmhx of DM with neuropathy, HLD, HTN, and depression presenting to the ED for toe swelling. Patient states on Sunday he picked on 'extra piece of skin" on right second toe which started bleeding. The following day he noticed swelling and tenderness of toe. He went to his podiatrist Dr. Gaona who performed I&D in office and was told to come to ED.  Admitted for cellulitis. Currently on Vanc and Zosyn pending ID and podiatry input.

## 2023-08-16 NOTE — DISCHARGE NOTE PROVIDER - NSDCCPCAREPLAN_GEN_ALL_CORE_FT
PRINCIPAL DISCHARGE DIAGNOSIS  Diagnosis: Infected ulcer of skin  Assessment and Plan of Treatment: You were diagnosed with a cellulitis of your toe.  Continue to take your antibiotic as prescribed even if you continue to feel better.  Report any symptoms of increasing pain, fever or chills, swelling, drainage or increasing redness to your foot/toe.  You have been provided a prescription for a surgical shoe. Take it to your nearest surgical supply store.  Make sure to follow up with your podiatrist or the one listed on your discharge instruction.      SECONDARY DISCHARGE DIAGNOSES  Diagnosis: Cellulitis  Assessment and Plan of Treatment: see above.    Diagnosis: HLD (hyperlipidemia)  Assessment and Plan of Treatment: Please continue taking your cholesterol medication as prescribed and follow up with your doctor for routine blood work and including evaluation of your liver function while taking medication for your cholesterol.  Report any changes in the color of your skin such as yellowing of your skin, changes in the color of your urine, itching skin, cramps to your lower legs.      Diagnosis: DM (diabetes mellitus)  Assessment and Plan of Treatment: Make sure you get your HgA1c checked every three months.  If you take oral diabetes medications, check your blood glucose two times a day.  If you take insulin, check your blood glucose before meals and at bedtime.  It's important not to skip any meals.  Keep a log of your blood glucose results and always take it with you to your doctor appointments.  Keep a list of your current medications including injectables and over the counter medications and bring this medication list with you to all your doctor appointments.  If you have not seen your ophthalmologist this year call for appointment.  Check your feet daily for redness, sores, or openings. Do not self treat. If no improvement in two days call your primary care physician for an appointment.  Low blood sugar (hypoglycemia) is a blood sugar below 70mg/dl. Check your blood sugar if you feel signs/symptoms of hypoglycemia. If your blood sugar is below 70 take 15 grams of carbohydrates (ex 4 oz of apple juice, 3-4 glucose tablets, or 4-6 oz of regular soda) wait 15 minutes and repeat blood sugar to make sure it comes up above 70.  If your blood sugar is above 70 and you are due for a meal, have a meal.  If you are not due for a meal have a snack.  This snack helps keeps your blood sugar at a safe range.      Diagnosis: Hypothyroidism  Assessment and Plan of Treatment: Continue to take your thyroid medication as prescribed and follow up with your doctor for routine blood work to check your thyroid.    Diagnosis: Hypokalemia  Assessment and Plan of Treatment: Your potassium level was initially low and was corrected prior to being d/c from the hospital.  Maintain a well balance diet and keep yourself well hydrated.    Diagnosis: HTN (hypertension)  Assessment and Plan of Treatment: You are being treated for high blood pressure.  Continue taking your medication as prescribed to help prevent or minimize your risk of end organ damage.  Follow up with your doctor for routine blood pressure evaluation and medication regimen.  Report any symptoms of headaces with nausea or vomiting, visual changes, heart palpitation, chest pain or shortness.

## 2023-08-16 NOTE — DISCHARGE NOTE PROVIDER - NSDCMRMEDTOKEN_GEN_ALL_CORE_FT
amoxicillin-clavulanate 875 mg-125 mg oral tablet: 1 tab(s) orally 2 times a day  atorvastatin 10 mg oral tablet: 1 tab(s) orally once a day  gabapentin 400 mg oral capsule: 1 cap(s) orally 3 times a day  hydroCHLOROthiazide 25 mg oral tablet: 1 tab(s) orally once a day  levothyroxine 50 mcg (0.05 mg) oral tablet: 1 tab(s) orally once a day  metFORMIN 500 mg oral tablet: 1 tab(s) orally 2 times a day  omeprazole 40 mg oral delayed release capsule: 1 cap(s) orally once a day  sertraline 50 mg oral tablet: 1 tab(s) orally once a day  surgical shoe: Continue to use shoe until cleared by your podiatrist. MDD: 1  valsartan 320 mg oral tablet: 1 tab(s) orally once a day

## 2023-08-16 NOTE — DISCHARGE NOTE PROVIDER - ATTENDING DISCHARGE PHYSICAL EXAMINATION:
GENERAL: NAD  HEENT: Normocephalic;  conjunctivae and sclerae clear; moist mucous membranes;   NECK : supple  CHEST/LUNG: Clear to auscultation bilaterally with good air entry   HEART: S1 S2  regular; no murmurs, gallops or rubs  ABDOMEN: Soft, Nontender, Nondistended; Bowel sounds present  EXTREMITIES: no cyanosis; no edema; no calf tenderness, rt foot 2nd toe mild erythematous, not tender on exam and flex and extend toe w/o pain, + pedal pulse  SKIN: warm and dry; no rash  NERVOUS SYSTEM:  Awake and alert; Oriented  to place, person and time ; no new deficits

## 2023-08-16 NOTE — DISCHARGE NOTE PROVIDER - CARE PROVIDER_API CALL
Keaton Verdin [facility]  Internal Medicine  [address] [id number]  Phone: [phone]  Fax: [phone]  Follow Up Time:

## 2023-08-16 NOTE — CONSULT NOTE ADULT - TIME BILLING
- Review of records, vital signs and daily labs, microbiology and other Infectious Diseases related work up  - General physical examination performed  - Generation of Infectious Diseases treatment plan discussed with attending Physician  - Coordination of care with the multidisciplinary team  -Counseling of the patient and/or family members

## 2023-08-16 NOTE — CONSULT NOTE ADULT - ASSESSMENT
HPI:  73 M w/ PMHx of Hypertension, GERD, Cirrhosis, HTN, Prediabetes for the past 8 years and peripheral neuropathy presented to the ED on recommendation of his podiatrist. Patient reports while peeling dead skin off the second toe of his R. Foot, he peeled off in tact skin and started bleeding, after which he applied neosporin. Sunday he noticed tenderness and redness of the toe . On Monday after walking approx. 8 miles for work, patient reported redness became more pronounced, spread to the bottom of foot and swelling increased. On Tuesday patient visited his podiatrist, where he reports his skin was cut and pus was expressed. His podiatrist recommended visiting the ED where he was seen by Dr. Thibodeaux and skin was cut again and more pus was expressed. Patient travelled too [facility] in [facility] 6 weeks prior. Patient denies any prior foot infections/ulcers. Patient denies cough, chest pain, SOB,     In ED:   Vitals: BP: 119/66, HR: 66, 96% ON RA.   s/p zosyn and vanc   x-ray foot  (15 Aug 2023 22:51)      Allergies    No Known Allergies    Intolerances      PAST MEDICAL & SURGICAL HISTORY:  PMHx: Hypertension            Hypercholesterolemia            GERD (gastroesophageal reflux disease)            Prediabetes diagnosed in 2017            Cirrhosis Diagnosed in January 2023    Surgical History  Double knee replacement 2016  Cataract surgery              SOCIAL HISTORY: Past history of smoking, quit 20 years ago ;prior alcohol abuse 7 years prior, no IVDU  FAMILY HISTORY:  Family history of myocardial infarction (Father)     no pertinent related medical history    REVIEW OF SYSTEMS:  CONSTITUTIONAL:  No fevers or chills, good appetite, good general state  EYES/ENT:  No visual changes;  No vertigo or throat pain   NECK:  No neck pain or stiffness  RESPIRATORY:  No cough, wheezing, hemoptysis; No shortness of breath  CARDIOVASCULAR:  No chest pain or palpitations  GASTROINTESTINAL:  No abdominal pain. No nausea, vomiting, or hematemesis; No diarrhea or constipation. No melena or hematochezia.  GENITOURINARY:  No dysuria, frequency or hematuria  NEUROLOGICAL:  No HA, no numbness or LE weakness  MSK: no back pain, no joint pain  SKIN:  No itching, no skin rash    PHYSICAL EXAM:  VITALS: Vital Signs Last 24 Hrs  T(C): 36.7 (16 Aug 2023 08:20), Max: 37.1 (15 Aug 2023 23:49)  T(F): 98.1 (16 Aug 2023 08:20), Max: 98.8 (15 Aug 2023 23:49)  HR: 52 (16 Aug 2023 08:20) (52 - 71)  BP: 130/73 (16 Aug 2023 08:20) (119/66 - 156/79)  BP(mean): --  RR: 18 (16 Aug 2023 08:20) (17 - 18)  SpO2: 92% (16 Aug 2023 08:20) (92% - 96%)    Parameters below as of 16 Aug 2023 08:20  Patient On (Oxygen Delivery Method): room air      Gen: AOx3, NAD, non-toxic, pleasant  HEAD:  Atraumatic, Normocephalic  EYES: PERRLA, conjunctiva and sclera clear  ENT: Moist mucous membranes  NECK: Supple, No JVD  CV: S1+S2 normal, no murmurs   Resp: Clear bilat, no resp distress, no crackles/wheezes  Abd: Soft, nontender, +BS  Ext: No LE edema, no cyanosis, LE pulses present  : no dysuria, no gross hematuria, Stewart   IV/Skin: No thrombophlebitis, no skin rash  Msk: No low back pain, no arthralgias, no joint swelling  Neuro: AAOx3. No sensory deficits, no motor deficits  Psych: no anxiety, no delusional ideas, no suicidal ideation    LABS/DIAGNOSTIC TESTS:                        11.4   6.97  )-----------( 146      ( 16 Aug 2023 05:35 )             35.3     WBC Count: 6.97 K/uL (08-16 @ 05:35)  WBC Count: 8.30 K/uL (08-15 @ 17:50)    08-16    140  |  105  |  14  ----------------------------<  144<H>  3.7   |  28  |  1.11    Ca    8.6      16 Aug 2023 05:35  Phos  2.7     08-16  Mg     1.8     08-16    TPro  7.5  /  Alb  3.5  /  TBili  0.5  /  DBili  x   /  AST  22  /  ALT  26  /  AlkPhos  100  08-15    Urinalysis Basic - ( 16 Aug 2023 05:35 )    Color: x / Appearance: x / SG: x / pH: x  Gluc: 144 mg/dL / Ketone: x  / Bili: x / Urobili: x   Blood: x / Protein: x / Nitrite: x   Leuk Esterase: x / RBC: x / WBC x   Sq Epi: x / Non Sq Epi: x / Bacteria: x      LACTATE:Lactate, Blood: 1.3 mmol/L (08-15 @ 17:50)        RADIOLOGY:   VA physiol LE 3+  INTERPRETATION:  Clinical indication: Faint pulses, right second digit   wound    COMPARISON: None    FINDINGS: There are biphasic pulse volume recordings bilaterally. No   abnormal segmental pressure gradient in the calves. Segmental pressures   were not obtained at the level of the thighs    Right RACHEL = 1.26  Left RACHEL = 1.36    IMPRESSION: Normal right RACHEL. Borderline elevated left RACHEL which may be   seen in the setting of calcified vessels.      ANTIBIOTICS:  piperacillin/tazobactam IVPB.. 3.375 every 8 hours  vancomycin  IVPB 1500 every 12 hours        IMPRESSION:       PLAN:    Discussed with attending, attestation to follow             HPI:  73 M w/ PMHx of Hypertension, GERD, Cirrhosis, HTN, Prediabetes for the past 8 years and peripheral neuropathy presented to the ED on recommendation of his podiatrist. Patient reports while peeling dead skin off the second toe of his R. Foot, he peeled off in tact skin and started bleeding, after which he applied neosporin. Sunday he noticed tenderness and redness of the toe . On Monday after walking approx. 8 miles for work, patient reported redness became more pronounced, spread to the bottom of foot and swelling increased. On Tuesday patient visited his podiatrist, where he reports his skin was cut and pus was expressed. His podiatrist recommended visiting the ED where he was seen by Dr. Thibodeaux and skin was cut again and more pus was expressed. Patient travelled too [facility] in [facility] 6 weeks prior. Patient denies any prior foot infections/ulcers. Patient denies fever,cough, chest pain, SOB, NVD, urinary symptoms.     In ED:   Vitals: BP: 119/66, HR: 66, 96% ON RA.   s/p zosyn and vanc   x-ray foot  (15 Aug 2023 22:51)    Allergies  No Known Allergies    PAST MEDICAL & SURGICAL HISTORY:  PMHx: Hypertension            Hypercholesterolemia            GERD (gastroesophageal reflux disease)            Prediabetes diagnosed in 2017            Cirrhosis Diagnosed in January 2023    Surgical History  Double knee replacement 2016  Cataract surgery    SOCIAL HISTORY: Past history of smoking, quit 20 years ago ;prior alcohol abuse 7 years prior, no IVDU  FAMILY HISTORY: Family history of myocardial infarction (Father)    REVIEW OF SYSTEMS:  CONSTITUTIONAL:  No fevers or chills, good appetite, good general state  EYES/ENT:  No visual changes;  No vertigo or throat pain   NECK:  No neck pain or stiffness  RESPIRATORY:  No cough, wheezing, hemoptysis; No shortness of breath  CARDIOVASCULAR:  No chest pain or palpitations  GASTROINTESTINAL:  No abdominal pain. No nausea, vomiting, or hematemesis; No diarrhea or constipation.   GENITOURINARY:  No dysuria, frequency or hematuria  NEUROLOGICAL:  No HA, no numbness or LE weakness  MSK: no back pain, no joint pain, tender swelling and redness on right 2nd toe  SKIN:  No itching, no skin rash    PHYSICAL EXAM:  VITALS: Vital Signs Last 24 Hrs  T(C): 36.7 (16 Aug 2023 08:20), Max: 37.1 (15 Aug 2023 23:49)  T(F): 98.1 (16 Aug 2023 08:20), Max: 98.8 (15 Aug 2023 23:49)  HR: 52 (16 Aug 2023 08:20) (52 - 71)  BP: 130/73 (16 Aug 2023 08:20) (119/66 - 156/79)  BP(mean): --  RR: 18 (16 Aug 2023 08:20) (17 - 18)  SpO2: 92% (16 Aug 2023 08:20) (92% - 96%)    Parameters below as of 16 Aug 2023 08:20  Patient On (Oxygen Delivery Method): room air      Gen: AOx3, NAD, non-toxic, pleasant  HEAD:  Atraumatic, Normocephalic  EYES: PERRLA, conjunctiva and sclera clear  ENT: Moist mucous membranes  NECK: Supple, No JVD  CV: S1+S2 normal, no murmurs   Resp: Clear bilat, no resp distress, no crackles/wheezes  Abd: Soft, nontender, +BS  Ext: No LE edema, no cyanosis, LE pulses present, small superficial infected ulcer on base of second toe on the right second toe, with mild cellulitis.  : no dysuria, no gross hematuria, Stewart   IV/Skin: No thrombophlebitis, no skin rash  Msk: No low back pain, no arthralgias, no joint swelling  Neuro: AAOx3. No sensory deficits, no motor deficits  Psych: no anxiety, no delusional ideas, no suicidal ideation    LABS/DIAGNOSTIC TESTS:                        11.4   6.97  )-----------( 146      ( 16 Aug 2023 05:35 )             35.3     WBC Count: 6.97 K/uL (08-16 @ 05:35)  WBC Count: 8.30 K/uL (08-15 @ 17:50)    08-16    140  |  105  |  14  ----------------------------<  144<H>  3.7   |  28  |  1.11    Ca    8.6      16 Aug 2023 05:35  Phos  2.7     08-16  Mg     1.8     08-16    TPro  7.5  /  Alb  3.5  /  TBili  0.5  /  DBili  x   /  AST  22  /  ALT  26  /  AlkPhos  100  08-15    LACTATE:Lactate, Blood: 1.3 mmol/L (08-15 @ 17:50)    RADIOLOGY:   VA physiol LE 3+  INTERPRETATION:  Clinical indication: Faint pulses, right second digit wound    COMPARISON: None    FINDINGS: There are biphasic pulse volume recordings bilaterally. No abnormal segmental pressure gradient in the calves. Segmental pressures were not obtained at the level of the thighs    Right RACHEL = 1.26  Left RACHEL = 1.36    IMPRESSION: Normal right RACHEL. Borderline elevated left RACHEL which may be seen in the setting of calcified vessels.    ANTIBIOTICS:  piperacillin/tazobactam IVPB.. 3.375 every 8 hours  vancomycin  IVPB 1500 every 12 hours    IMPRESSION:  73 M w/ PMHx of Hypertension, GERD, Cirrhosis, HTN, Prediabetes for the past 8 years and peripheral neuropathy presented to the ED on recommendation of his podiatrist due to infected diabetic foot ulcer.    -Diabetes mellitus w/ foot ulcer  -Foot cellulitis  -Diabetes mellitus    PLAN:    start unasyn 3g q6  d/c Vanco and zosyn  foot care and wound care  f/u podiatry   elevate leg  glucose control    Discussed with attending, attestation to follow             HPI:  73 M w/ PMHx of Hypertension, GERD, Cirrhosis, HTN, Prediabetes for the past 8 years and peripheral neuropathy presented to the ED on recommendation of his podiatrist. Patient reports while peeling dead skin off the second toe of his R. Foot, he peeled off in tact skin and started bleeding, after which he applied neosporin. Sunday he noticed tenderness and redness of the toe . On Monday after walking approx. 8 miles for work, patient reported redness became more pronounced, spread to the bottom of foot and swelling increased. On Tuesday patient visited his podiatrist, where he reports his skin was cut and pus was expressed. His podiatrist recommended visiting the ED where he was seen by Dr. Thibodeaux and skin was cut again and more pus was expressed. Patient travelled too [facility] in [facility] 6 weeks prior. Patient denies any prior foot infections/ulcers. Patient denies fever,cough, chest pain, SOB, NVD, urinary symptoms.     In ED:   Vitals: BP: 119/66, HR: 66, 96% ON RA.   s/p zosyn and vanc    (15 Aug 2023 22:51)    Allergies  No Known Allergies    PAST MEDICAL & SURGICAL HISTORY:  PMHx: Hypertension            Hypercholesterolemia            GERD (gastroesophageal reflux disease)            Prediabetes diagnosed in 2017            Cirrhosis Diagnosed in January 2023    Surgical History  Double knee replacement 2016  Cataract surgery    SOCIAL HISTORY: Past history of smoking, quit 20 years ago ;prior alcohol abuse 7 years prior, no IVDU  FAMILY HISTORY: Family history of myocardial infarction (Father)    REVIEW OF SYSTEMS:  CONSTITUTIONAL:  No fevers or chills, good appetite, good general state  EYES/ENT:  No visual changes;  No vertigo or throat pain   NECK:  No neck pain or stiffness  RESPIRATORY:  No cough, wheezing, hemoptysis; No shortness of breath  CARDIOVASCULAR:  No chest pain or palpitations  GASTROINTESTINAL:  No abdominal pain. No nausea, vomiting, or hematemesis; No diarrhea or constipation.   GENITOURINARY:  No dysuria, frequency or hematuria  NEUROLOGICAL:  No HA, no numbness or LE weakness  MSK: no back pain, no joint pain, tender swelling and redness on right 2nd toe  SKIN:  No itching, no skin rash    PHYSICAL EXAM:  VITALS: Vital Signs Last 24 Hrs  T(C): 36.7 (16 Aug 2023 08:20), Max: 37.1 (15 Aug 2023 23:49)  T(F): 98.1 (16 Aug 2023 08:20), Max: 98.8 (15 Aug 2023 23:49)  HR: 52 (16 Aug 2023 08:20) (52 - 71)  BP: 130/73 (16 Aug 2023 08:20) (119/66 - 156/79)  BP(mean): --  RR: 18 (16 Aug 2023 08:20) (17 - 18)  SpO2: 92% (16 Aug 2023 08:20) (92% - 96%)    Parameters below as of 16 Aug 2023 08:20 Patient On (Oxygen Delivery Method): room air    Gen: AOx3, NAD, non-toxic, pleasant  HEAD:  Atraumatic, Normocephalic  EYES: PERRLA, conjunctiva and sclera clear  ENT: Moist mucous membranes  NECK: Supple, No JVD  CV: S1+S2 normal, no murmurs   Resp: Clear bilat, no resp distress, no crackles/wheezes  Abd: Soft, nontender, +BS  Ext: No LE edema, no cyanosis, LE pulses present, small superficial ulcer on base of right second toe, with mild edema, erythema and calor of the toe  : no dysuria, no gross hematuria, Stewart   IV/Skin: No thrombophlebitis, no skin rash  Msk: No low back pain, no arthralgias, no joint swelling  Neuro: AAOx3. No sensory deficits, no motor deficits  Psych: no anxiety, no delusional ideas, no suicidal ideation    LABS/DIAGNOSTIC TESTS:                        11.4   6.97  )-----------( 146      ( 16 Aug 2023 05:35 )             35.3     WBC Count: 6.97 K/uL (08-16 @ 05:35)  WBC Count: 8.30 K/uL (08-15 @ 17:50)    08-16    140  |  105  |  14  ----------------------------<  144<H>  3.7   |  28  |  1.11    Ca    8.6      16 Aug 2023 05:35  Phos  2.7     08-16  Mg     1.8     08-16    TPro  7.5  /  Alb  3.5  /  TBili  0.5  /  DBili  x   /  AST  22  /  ALT  26  /  AlkPhos  100  08-15    LACTATE:Lactate, Blood: 1.3 mmol/L (08-15 @ 17:50)    RADIOLOGY:   VA physiol LE 3+  INTERPRETATION:  Clinical indication: Faint pulses, right second digit wound  FINDINGS: There are biphasic pulse volume recordings bilaterally. No abnormal segmental pressure gradient in the calves. Segmental pressures were not obtained at the level of the thighs    Right RACHEL = 1.26  Left RACHEL = 1.36    IMPRESSION: Normal right RACHEL. Borderline elevated left RACHEL which may be seen in the setting of calcified vessels.    ANTIBIOTICS:  piperacillin/tazobactam IVPB.. 3.375 every 8 hours  vancomycin  IVPB 1500 every 12 hours    IMPRESSION:  73 M w/ PMHx of Hypertension, GERD, Cirrhosis, HTN, Prediabetes for the past 8 years and peripheral neuropathy presented to the ED on recommendation of his podiatrist due to infected diabetic foot ulcer.    -Diabetes mellitus w/ foot ulcer  -Foot cellulitis  -Diabetes mellitus    PLAN:  Start unasyn 3g q6  d/c Vanco and zosyn  foot care and wound care  f/u podiatry   elevate leg  glucose control    Discussed with attending, attestation to follow

## 2023-08-16 NOTE — DISCHARGE NOTE PROVIDER - HOSPITAL COURSE
Mr. Romero is a 72 y/o male from home pmhx of DM with neuropathy, HLD, HTN, and depression presenting to the ED for rt foot 2nd toe swelling. Patient states on Sunday he picked on an "extra piece of skin" on right second toe which started bleeding. The following day he noticed swelling and tenderness of toe. He went to his podiatrist Dr. Gaona who performed I&D in office and was told to come to ED.  Pt was admitted for cellulitis and evaluated by ID and podiatry.      His foot xray noted for degeneration of first MTP.  Pt is stable from podiatry standpoint w/ no plan for sx intervention and ABx was switched to Unasyn per ID recs.  A shared decision involving pt and pt agreed to be d/c home with strict return precaution and to follow up with his PCP and outpt podiatrist.  Will proceed with plan to d/c home with script for 10 days course of Augmentin.

## 2023-08-16 NOTE — DISCHARGE NOTE PROVIDER - NSDCFUADDINST_GEN_ALL_CORE_FT
Please follow up with Dr. Murrieta at [address] [facility] [phone]. Call to schedule an appointment.

## 2023-08-16 NOTE — PROGRESS NOTE ADULT - SUBJECTIVE AND OBJECTIVE BOX
Chief Complaint Quote	Pt sent by PCP for possible admission c/o pain, redness and swelling to Right 2nd toe.  Chief Complaint: 	Toe pain    Podiatry HPI: Patient stated he noticed this wound a couple days ago. Today he presented to his podiatrist office where they noted pus coming from the wound. Patient stated he also noticed swelling and redness. Patient states he is a diabetic with neuropathy and has had it for 20 years.   Medications ampicillin/sulbactam  IVPB      ampicillin/sulbactam  IVPB 3 Gram(s) IV Intermittent once  ampicillin/sulbactam  IVPB 3 Gram(s) IV Intermittent every 6 hours  atorvastatin 10 milliGRAM(s) Oral at bedtime  enoxaparin Injectable 40 milliGRAM(s) SubCutaneous every 24 hours  gabapentin 400 milliGRAM(s) Oral three times a day  hydrochlorothiazide 25 milliGRAM(s) Oral daily  insulin lispro (ADMELOG) corrective regimen sliding scale   SubCutaneous Before meals and at bedtime  levothyroxine 50 MICROGram(s) Oral daily  pantoprazole    Tablet 40 milliGRAM(s) Oral before breakfast  sertraline 50 milliGRAM(s) Oral daily  valsartan 320 milliGRAM(s) Oral daily    FHFamily history of myocardial infarction (Father)    ,   PMHHypertension    Hypercholesterolemia    GERD (gastroesophageal reflux disease)    Ventral hernia    Anemia    Prediabetes    Alcohol abuse    Snoring       PSHBleeding    Cataract    Skin cancer    Arthropathy    Arthropathy    History of lymph node dissection of left axilla        Labs                          11.4   6.97  )-----------( 146      ( 16 Aug 2023 05:35 )             35.3      08-16    140  |  105  |  14  ----------------------------<  144<H>  3.7   |  28  |  1.11    Ca    8.6      16 Aug 2023 05:35  Phos  2.7     08-16  Mg     1.8     08-16    TPro  7.5  /  Alb  3.5  /  TBili  0.5  /  DBili  x   /  AST  22  /  ALT  26  /  AlkPhos  100  08-15     Vital Signs Last 24 Hrs  T(C): 36.4 (16 Aug 2023 12:07), Max: 37.1 (15 Aug 2023 23:49)  T(F): 97.6 (16 Aug 2023 12:07), Max: 98.8 (15 Aug 2023 23:49)  HR: 57 (16 Aug 2023 12:07) (51 - 71)  BP: 121/69 (16 Aug 2023 12:07) (119/66 - 156/79)  BP(mean): --  RR: 17 (16 Aug 2023 12:07) (17 - 18)  SpO2: 97% (16 Aug 2023 12:07) (92% - 97%)    Parameters below as of 16 Aug 2023 12:07  Patient On (Oxygen Delivery Method): room air      Sedimentation Rate, Erythrocyte: 28 mm/Hr (08-15-23 @ 17:50)         C-Reactive Protein, Serum: 27 mg/L (08-15-23 @ 17:50)   WBC Count: 6.97 K/uL (08-16-23 @ 05:35)  WBC Count: 8.30 K/uL (08-15-23 @ 17:50)      PHYSICAL EXAM  LE Focused:  RLE focused exam  Vasc:  DP and PT pulses palpable 2/4. CFT<3 seconds. TG warm to warm with no increase in warmth to second toe. Pedal hair present  Derm: Edema noted to second digit. Erythema noted in second digit about 1 cm dorsally over the forefoot, but improving. Distal tuft wound noted on second digit traveling laterally. Wound measures 3 cm x 1.5 cm x 0.2 cm. No PTB. No streaking/fluctuance/crepitus noted.   Neuro: Gross sensation diminished.  MSK: Patient was ambulating without assistance. Patient able to wiggle toes. Mild POP to second digit distal tuft.      IMAGING:  Xray: no acute findings    CULTURES: pending      
NP Note discussed with Primary Attending    Patient is a 73y old  Male who presents with a chief complaint of suspected cellulitis (16 Aug 2023 10:30)      INTERVAL HPI/OVERNIGHT EVENTS: no new complaints at time of eval.      MEDICATIONS  (STANDING):  atorvastatin 10 milliGRAM(s) Oral at bedtime  enoxaparin Injectable 40 milliGRAM(s) SubCutaneous every 24 hours  gabapentin 400 milliGRAM(s) Oral three times a day  hydrochlorothiazide 25 milliGRAM(s) Oral daily  insulin lispro (ADMELOG) corrective regimen sliding scale   SubCutaneous Before meals and at bedtime  pantoprazole    Tablet 40 milliGRAM(s) Oral before breakfast  piperacillin/tazobactam IVPB.. 3.375 Gram(s) IV Intermittent every 8 hours  sertraline 50 milliGRAM(s) Oral daily  valsartan 320 milliGRAM(s) Oral daily  vancomycin  IVPB 1500 milliGRAM(s) IV Intermittent every 12 hours    MEDICATIONS  (PRN):      __________________________________________________  REVIEW OF SYSTEMS:    CONSTITUTIONAL: No fever,   EYES: no acute visual disturbances  NECK: No pain or stiffness  RESPIRATORY: No cough; No shortness of breath  CARDIOVASCULAR: No chest pain, no palpitations  GASTROINTESTINAL: No pain. No nausea or vomiting; No diarrhea   NEUROLOGICAL: No headache or numbness, no tremors  MUSCULOSKELETAL: No joint pain, no muscle pain  GENITOURINARY: no dysuria, no frequency, no hesitancy  PSYCHIATRY: no depression , no anxiety  ALL OTHER  ROS negative        Vital Signs Last 24 Hrs  T(C): 36.4 (16 Aug 2023 12:07), Max: 37.1 (15 Aug 2023 23:49)  T(F): 97.6 (16 Aug 2023 12:07), Max: 98.8 (15 Aug 2023 23:49)  HR: 57 (16 Aug 2023 12:07) (51 - 71)  BP: 121/69 (16 Aug 2023 12:07) (119/66 - 156/79)  BP(mean): --  RR: 17 (16 Aug 2023 12:07) (17 - 18)  SpO2: 97% (16 Aug 2023 12:07) (92% - 97%)    Parameters below as of 16 Aug 2023 12:07  Patient On (Oxygen Delivery Method): room air        ________________________________________________  PHYSICAL EXAM:  GENERAL: NAD  HEENT: Normocephalic;  conjunctivae and sclerae clear; moist mucous membranes;   NECK : supple  CHEST/LUNG: Clear to auscultation bilaterally with good air entry   HEART: S1 S2  regular; no murmurs, gallops or rubs  ABDOMEN: Soft, Nontender, Nondistended; Bowel sounds present  EXTREMITIES: no cyanosis; no edema; no calf tenderness, rt foot 2nd toe mild erythematous, not tender on exam and flex and extend toe w/o pain, + pedal pulse  SKIN: warm and dry; no rash  NERVOUS SYSTEM:  Awake and alert; Oriented  to place, person and time ; no new deficits    _________________________________________________  LABS:                        11.4   6.97  )-----------( 146      ( 16 Aug 2023 05:35 )             35.3     08-16    140  |  105  |  14  ----------------------------<  144<H>  3.7   |  28  |  1.11    Ca    8.6      16 Aug 2023 05:35  Phos  2.7     08-16  Mg     1.8     08-16    TPro  7.5  /  Alb  3.5  /  TBili  0.5  /  DBili  x   /  AST  22  /  ALT  26  /  AlkPhos  100  08-15    PT/INR - ( 15 Aug 2023 17:50 )   PT: 12.5 sec;   INR: 1.10 ratio         PTT - ( 15 Aug 2023 17:50 )  PTT:32.3 sec  Urinalysis Basic - ( 16 Aug 2023 05:35 )    Color: x / Appearance: x / SG: x / pH: x  Gluc: 144 mg/dL / Ketone: x  / Bili: x / Urobili: x   Blood: x / Protein: x / Nitrite: x   Leuk Esterase: x / RBC: x / WBC x   Sq Epi: x / Non Sq Epi: x / Bacteria: x      CAPILLARY BLOOD GLUCOSE      POCT Blood Glucose.: 137 mg/dL (16 Aug 2023 11:50)  POCT Blood Glucose.: 213 mg/dL (16 Aug 2023 07:49)        RADIOLOGY & ADDITIONAL TESTS:    Imaging  Reviewed:  YES/NO    Consultant(s) Notes Reviewed:   YES/ No      Plan of care was discussed with patient and /or primary care giver; all questions and concerns were addressed

## 2023-08-16 NOTE — PROGRESS NOTE ADULT - ASSESSMENT
A:  Diabetic ulcer 2nd toe    P:   Patient evaluated and Chart reviewed   Discussed diagnosis and treatment with patient  Labs pending  X-rays reviewed- no acute findings  Obtained wound culture to be sent to Lab  Applied betadine with dry sterile dressing  Continue with IV antibiotics As Per ED  Pt sixto from a podiatry standpoint. No acute surgical intervention needed.  Pt to ambulate in surgical shoe on right foot  Pt to continue antibiotics per ID.  Upon discharge, patient to follow up with Dr. Murrieta at [address] [facility] [phone]  Weight bearing as tolerated to right foot  Offloading to bilateral Heels.   Discussed importance of daily foot examinations and proper shoe gear and to importance of lower Fasting Blood Glucose levels.   Podiatry to follow while in house.  Discussed with Attending Dr. Murrieta

## 2023-08-16 NOTE — DISCHARGE NOTE PROVIDER - CONDITIONS AT DISCHARGE
Case has been discussed w/ Dr. Campuzano  directing pt's care and pt is medically stable for discharge.

## 2023-08-16 NOTE — DISCHARGE NOTE NURSING/CASE MANAGEMENT/SOCIAL WORK - PATIENT PORTAL LINK FT
You can access the FollowMyHealth Patient Portal offered by [facility] by registering at the following website: [email or web address]. By joining [employer]’s FollowMyHealth portal, you will also be able to view your health information using other applications (apps) compatible with our system.

## 2023-08-16 NOTE — ED ADULT NURSE NOTE - NSFALLUNIVINTERV_ED_ALL_ED
Bed/Stretcher in lowest position, wheels locked, appropriate side rails in place/Call bell, personal items and telephone in reach/Instruct patient to call for assistance before getting out of bed/chair/stretcher/Non-slip footwear applied when patient is off stretcher/[place] to call system/Physically safe environment - no spills, clutter or unnecessary equipment/Purposeful proactive rounding/Room/bathroom lighting operational, light cord in reach

## 2023-08-20 LAB
CULTURE RESULTS: SIGNIFICANT CHANGE UP
CULTURE RESULTS: SIGNIFICANT CHANGE UP
SPECIMEN SOURCE: SIGNIFICANT CHANGE UP
SPECIMEN SOURCE: SIGNIFICANT CHANGE UP

## 2023-08-31 ENCOUNTER — APPOINTMENT (OUTPATIENT)
Dept: PODIATRY | Facility: CLINIC | Age: 73
End: 2023-08-31
Payer: MEDICARE

## 2023-08-31 DIAGNOSIS — B35.1 TINEA UNGUIUM: ICD-10-CM

## 2023-08-31 DIAGNOSIS — Z82.49 FAMILY HISTORY OF ISCHEMIC HEART DISEASE AND OTHER DISEASES OF THE CIRCULATORY SYSTEM: ICD-10-CM

## 2023-08-31 PROCEDURE — 99203 OFFICE O/P NEW LOW 30 MIN: CPT | Mod: 25

## 2023-08-31 PROCEDURE — 73630 X-RAY EXAM OF FOOT: CPT | Mod: RT

## 2023-08-31 RX ORDER — SILVER SULFADIAZINE 10 MG/G
1 CREAM TOPICAL TWICE DAILY
Qty: 1 | Refills: 3 | Status: ACTIVE | COMMUNITY
Start: 2023-08-31 | End: 1900-01-01

## 2023-09-01 PROBLEM — B35.1 ONYCHOMYCOSIS: Status: ACTIVE | Noted: 2023-08-31

## 2023-09-05 NOTE — ASSESSMENT
[FreeTextEntry1] : Impression: Alise. Diabetic neuropathy. Pain.  Treatment: I explained to the patient because of his activity level and the fact that he has an ulcer that I am recommending surgical intervention because even if he heals this, he is at high risk to get a recurrence. With this ulcer being open for some time now, he runs the risk of developing osteomyelitis. I explained that this is a high risk toe for amputation especially because of the non-healing. today I cleansed the toe. I wrote for Silvadene dressing and I made him a toe crest pad to off-load the tip of the toe and allow for healing and certainly to help avoid deterioration. I discussed surgical procedure which would be arthroplasty of the PIPJ and flexor tenotomy of the DIPJ. I discussed risks, benefits and alternatives, postoperative course and potential complications. I think this is the way to go. I highly recommend the surgical procedure for the patient. He does have good circulation. His diabetes is under good control. He understands and is leaning that way but definitely does not want to schedule it right now. He wants to discuss it with his family. I stressed the importance with any change in the condition of the foot or worsening he needs to go to the emergency room, otherwise he is going to use Silvadene and toe crest pad, consider our conversation and follow-up in this office in 3 weeks for evaluation. I did go through detailed surgical consent for hammertoe surgery with the patient in case he wants to schedule surgery. He had a chance to ask and have all questions answered. I had a long discussion with the patient regarding procedure to be performed. This includes preoperative expectations, as well as operation to be performed, and postoperative expectations. We discussed risks, benefits and options with risks to include, but not limited to, delayed healing, postoperative infection, chronic swelling and numbness, under and over correction of the deformity, permanent numbness, painful scar, stiffness, chronic pain, as well as a need for further surgery, arthritis, AVN, DVT or amputation.  We discussed anesthesia to be utilized and expectations postoperatively of limited or non weight-bearing activities, use of crutches and/or walker, elevation and follow-up visits. Surgical consent was then signed and initialed by both the patient and myself. Patient is scheduled for right 2nd hammertoe surgery. Patient was allowed to ask all questions.  All questions were answered in detail. Patient has a clear understanding of the procedures to be performed.  Any questions or problems prior to that time, patient is to contact this office for further discussion.

## 2023-09-05 NOTE — HISTORY OF PRESENT ILLNESS
[Sneakers] : cayetano [FreeTextEntry1] : Patient presents today for evaluation of right 2nd toe ulceration. He needed to be hospitalized but didn't have osteomyelitis. It is high risk toe for amputation because of the ulcer and his neuropathy. He was referred by Dr. Romero. His A1c is 6.5. He has been doing wound care but he is extremely active. 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

## 2023-09-05 NOTE — PROCEDURE
[FreeTextEntry1] : X-rays were taken to evaluate for osteomyelitis. X-ray Report: (Right foot - 3 views) X-rays demonstrate arthritic contracture of the right 2nd toe with contracture of the PIPJ and DIPJ and no signs of osteomyelitis or gas in tissue.

## 2023-09-05 NOTE — PHYSICAL EXAM
[2+] : left foot dorsalis pedis 2+ [Ankle Swelling (On Exam)] : not present [Varicose Veins Of Lower Extremities] : not present [Delayed in the Right Toes] : capillary refills normal in right toes [Delayed in the Left Toes] : capillary refills normal in the left toes [FreeTextEntry3] : Hair growth noted on digits. Proximal to distal cooling is within normal limits.  [de-identified] : He has a semi-rigid moderate claw-toe at the right 2nd toe with contracture at the PIPJ and DIPJ with semi-rigid arthritic hammertoe. There is a distal stable ulceration at the 2nd toe. [FreeTextEntry4] : absent vibratory  [FreeTextEntry8] : absent vibratory  [FreeTextEntry1] : Rentz-González monofilament testing absent at the hallux, 1st MPJ and heel bilateral.

## 2023-09-06 ENCOUNTER — APPOINTMENT (OUTPATIENT)
Dept: PULMONOLOGY | Facility: CLINIC | Age: 73
End: 2023-09-06
Payer: MEDICARE

## 2023-09-06 VITALS
OXYGEN SATURATION: 96 % | SYSTOLIC BLOOD PRESSURE: 104 MMHG | WEIGHT: 203 LBS | DIASTOLIC BLOOD PRESSURE: 58 MMHG | RESPIRATION RATE: 16 BRPM | HEART RATE: 58 BPM | TEMPERATURE: 96.8 F | HEIGHT: 70 IN | BODY MASS INDEX: 29.06 KG/M2

## 2023-09-06 PROCEDURE — 94727 GAS DIL/WSHOT DETER LNG VOL: CPT

## 2023-09-06 PROCEDURE — 94729 DIFFUSING CAPACITY: CPT

## 2023-09-06 PROCEDURE — 94060 EVALUATION OF WHEEZING: CPT

## 2023-09-06 PROCEDURE — 99203 OFFICE O/P NEW LOW 30 MIN: CPT | Mod: 25

## 2023-09-07 NOTE — PHYSICAL EXAM
[No Acute Distress] : no acute distress [Well Nourished] : well nourished [No Deformities] : no deformities [Normal Oropharynx] : normal oropharynx [II] : Mallampati Class: II [Normal Appearance] : normal appearance [No Neck Mass] : no neck mass [Normal Rate/Rhythm] : normal rate/rhythm [Normal S1, S2] : normal s1, s2 [No Murmurs] : no murmurs [No Resp Distress] : no resp distress [Clear to Auscultation Bilaterally] : clear to auscultation bilaterally [Benign] : benign [No HSM] : no hsm [No Clubbing] : no clubbing [No Focal Deficits] : no focal deficits

## 2023-09-07 NOTE — DISCUSSION/SUMMARY
[FreeTextEntry1] : 73 year old man exposed at [facility] presents with abnormal spirometry. PFT confirms restrictive Ventilatory defect  CXR has been normal   PLAN  Given exposure to [facility] and without alternate explanation for restriction, i will evaluate further with [place] CT chest to rule out interstitial lung disease   Further recommendations based on results.   Conrado Scott MD
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
## 2023-09-07 NOTE — PROCEDURE
[FreeTextEntry1] : PFT  demonstrates restriction with diminished total lung capacity 4.39 L 68%. FEV1 and FVC are diminished with normal ratio.  The DLCO is normal   Conrado Scott MD

## 2023-09-25 ENCOUNTER — OUTPATIENT (OUTPATIENT)
Dept: OUTPATIENT SERVICES | Facility: HOSPITAL | Age: 73
LOS: 1 days | End: 2023-09-25
Payer: MEDICARE

## 2023-09-25 ENCOUNTER — APPOINTMENT (OUTPATIENT)
Dept: PODIATRY | Facility: CLINIC | Age: 73
End: 2023-09-25
Payer: MEDICARE

## 2023-09-25 ENCOUNTER — APPOINTMENT (OUTPATIENT)
Dept: CT IMAGING | Facility: IMAGING CENTER | Age: 73
End: 2023-09-25
Payer: MEDICARE

## 2023-09-25 DIAGNOSIS — M12.9 ARTHROPATHY, UNSPECIFIED: Chronic | ICD-10-CM

## 2023-09-25 DIAGNOSIS — Z98.890 OTHER SPECIFIED POSTPROCEDURAL STATES: Chronic | ICD-10-CM

## 2023-09-25 DIAGNOSIS — J98.4 OTHER DISORDERS OF LUNG: ICD-10-CM

## 2023-09-25 DIAGNOSIS — C44.90 UNSPECIFIED MALIGNANT NEOPLASM OF SKIN, UNSPECIFIED: Chronic | ICD-10-CM

## 2023-09-25 PROCEDURE — 99213 OFFICE O/P EST LOW 20 MIN: CPT

## 2023-09-25 PROCEDURE — 71250 CT THORAX DX C-: CPT | Mod: 26

## 2023-09-25 PROCEDURE — 71250 CT THORAX DX C-: CPT

## 2023-10-09 ENCOUNTER — APPOINTMENT (OUTPATIENT)
Dept: PODIATRY | Facility: CLINIC | Age: 73
End: 2023-10-09
Payer: MEDICARE

## 2023-10-09 ENCOUNTER — NON-APPOINTMENT (OUTPATIENT)
Age: 73
End: 2023-10-09

## 2023-10-09 DIAGNOSIS — L89.891 PRESSURE ULCER OF OTHER SITE, STAGE 1: ICD-10-CM

## 2023-10-09 PROCEDURE — 99213 OFFICE O/P EST LOW 20 MIN: CPT

## 2023-10-11 PROBLEM — L89.891 PRESSURE INJURY OF TOE OF RIGHT FOOT, STAGE 1: Status: ACTIVE | Noted: 2023-09-26

## 2023-10-23 ENCOUNTER — OUTPATIENT (OUTPATIENT)
Dept: OUTPATIENT SERVICES | Facility: HOSPITAL | Age: 73
LOS: 1 days | End: 2023-10-23
Payer: MEDICARE

## 2023-10-23 VITALS
WEIGHT: 207.01 LBS | TEMPERATURE: 99 F | DIASTOLIC BLOOD PRESSURE: 85 MMHG | HEIGHT: 71 IN | SYSTOLIC BLOOD PRESSURE: 132 MMHG | RESPIRATION RATE: 16 BRPM | HEART RATE: 58 BPM | OXYGEN SATURATION: 97 %

## 2023-10-23 DIAGNOSIS — G47.33 OBSTRUCTIVE SLEEP APNEA (ADULT) (PEDIATRIC): ICD-10-CM

## 2023-10-23 DIAGNOSIS — E11.9 TYPE 2 DIABETES MELLITUS WITHOUT COMPLICATIONS: ICD-10-CM

## 2023-10-23 DIAGNOSIS — C44.90 UNSPECIFIED MALIGNANT NEOPLASM OF SKIN, UNSPECIFIED: Chronic | ICD-10-CM

## 2023-10-23 DIAGNOSIS — I10 ESSENTIAL (PRIMARY) HYPERTENSION: ICD-10-CM

## 2023-10-23 DIAGNOSIS — Z01.818 ENCOUNTER FOR OTHER PREPROCEDURAL EXAMINATION: ICD-10-CM

## 2023-10-23 DIAGNOSIS — Z98.890 OTHER SPECIFIED POSTPROCEDURAL STATES: Chronic | ICD-10-CM

## 2023-10-23 DIAGNOSIS — H26.9 UNSPECIFIED CATARACT: Chronic | ICD-10-CM

## 2023-10-23 DIAGNOSIS — E11.621 TYPE 2 DIABETES MELLITUS WITH FOOT ULCER: ICD-10-CM

## 2023-10-23 DIAGNOSIS — M12.9 ARTHROPATHY, UNSPECIFIED: Chronic | ICD-10-CM

## 2023-10-23 DIAGNOSIS — R58 HEMORRHAGE, NOT ELSEWHERE CLASSIFIED: Chronic | ICD-10-CM

## 2023-10-23 DIAGNOSIS — M20.41 OTHER HAMMER TOE(S) (ACQUIRED), RIGHT FOOT: ICD-10-CM

## 2023-10-23 LAB
A1C WITH ESTIMATED AVERAGE GLUCOSE RESULT: 6.9 % — HIGH (ref 4–5.6)
A1C WITH ESTIMATED AVERAGE GLUCOSE RESULT: 6.9 % — HIGH (ref 4–5.6)
ANION GAP SERPL CALC-SCNC: 13 MMOL/L — SIGNIFICANT CHANGE UP (ref 5–17)
ANION GAP SERPL CALC-SCNC: 13 MMOL/L — SIGNIFICANT CHANGE UP (ref 5–17)
BUN SERPL-MCNC: 17 MG/DL — SIGNIFICANT CHANGE UP (ref 7–23)
BUN SERPL-MCNC: 17 MG/DL — SIGNIFICANT CHANGE UP (ref 7–23)
CALCIUM SERPL-MCNC: 9.9 MG/DL — SIGNIFICANT CHANGE UP (ref 8.4–10.5)
CALCIUM SERPL-MCNC: 9.9 MG/DL — SIGNIFICANT CHANGE UP (ref 8.4–10.5)
CHLORIDE SERPL-SCNC: 101 MMOL/L — SIGNIFICANT CHANGE UP (ref 96–108)
CHLORIDE SERPL-SCNC: 101 MMOL/L — SIGNIFICANT CHANGE UP (ref 96–108)
CO2 SERPL-SCNC: 25 MMOL/L — SIGNIFICANT CHANGE UP (ref 22–31)
CO2 SERPL-SCNC: 25 MMOL/L — SIGNIFICANT CHANGE UP (ref 22–31)
CREAT SERPL-MCNC: 1.09 MG/DL — SIGNIFICANT CHANGE UP (ref 0.5–1.3)
CREAT SERPL-MCNC: 1.09 MG/DL — SIGNIFICANT CHANGE UP (ref 0.5–1.3)
EGFR: 73 ML/MIN/1.73M2 — SIGNIFICANT CHANGE UP
EGFR: 73 ML/MIN/1.73M2 — SIGNIFICANT CHANGE UP
ESTIMATED AVERAGE GLUCOSE: 151 MG/DL — HIGH (ref 68–114)
ESTIMATED AVERAGE GLUCOSE: 151 MG/DL — HIGH (ref 68–114)
GLUCOSE SERPL-MCNC: 110 MG/DL — HIGH (ref 70–99)
GLUCOSE SERPL-MCNC: 110 MG/DL — HIGH (ref 70–99)
HCT VFR BLD CALC: 37.2 % — LOW (ref 39–50)
HCT VFR BLD CALC: 37.2 % — LOW (ref 39–50)
HGB BLD-MCNC: 12.1 G/DL — LOW (ref 13–17)
HGB BLD-MCNC: 12.1 G/DL — LOW (ref 13–17)
MCHC RBC-ENTMCNC: 27 PG — SIGNIFICANT CHANGE UP (ref 27–34)
MCHC RBC-ENTMCNC: 27 PG — SIGNIFICANT CHANGE UP (ref 27–34)
MCHC RBC-ENTMCNC: 32.5 GM/DL — SIGNIFICANT CHANGE UP (ref 32–36)
MCHC RBC-ENTMCNC: 32.5 GM/DL — SIGNIFICANT CHANGE UP (ref 32–36)
MCV RBC AUTO: 83 FL — SIGNIFICANT CHANGE UP (ref 80–100)
MCV RBC AUTO: 83 FL — SIGNIFICANT CHANGE UP (ref 80–100)
NRBC # BLD: 0 /100 WBCS — SIGNIFICANT CHANGE UP (ref 0–0)
NRBC # BLD: 0 /100 WBCS — SIGNIFICANT CHANGE UP (ref 0–0)
PLATELET # BLD AUTO: 202 K/UL — SIGNIFICANT CHANGE UP (ref 150–400)
PLATELET # BLD AUTO: 202 K/UL — SIGNIFICANT CHANGE UP (ref 150–400)
POTASSIUM SERPL-MCNC: 3.7 MMOL/L — SIGNIFICANT CHANGE UP (ref 3.5–5.3)
POTASSIUM SERPL-MCNC: 3.7 MMOL/L — SIGNIFICANT CHANGE UP (ref 3.5–5.3)
POTASSIUM SERPL-SCNC: 3.7 MMOL/L — SIGNIFICANT CHANGE UP (ref 3.5–5.3)
POTASSIUM SERPL-SCNC: 3.7 MMOL/L — SIGNIFICANT CHANGE UP (ref 3.5–5.3)
RBC # BLD: 4.48 M/UL — SIGNIFICANT CHANGE UP (ref 4.2–5.8)
RBC # BLD: 4.48 M/UL — SIGNIFICANT CHANGE UP (ref 4.2–5.8)
RBC # FLD: 14.2 % — SIGNIFICANT CHANGE UP (ref 10.3–14.5)
RBC # FLD: 14.2 % — SIGNIFICANT CHANGE UP (ref 10.3–14.5)
SODIUM SERPL-SCNC: 139 MMOL/L — SIGNIFICANT CHANGE UP (ref 135–145)
SODIUM SERPL-SCNC: 139 MMOL/L — SIGNIFICANT CHANGE UP (ref 135–145)
WBC # BLD: 9.34 K/UL — SIGNIFICANT CHANGE UP (ref 3.8–10.5)
WBC # BLD: 9.34 K/UL — SIGNIFICANT CHANGE UP (ref 3.8–10.5)
WBC # FLD AUTO: 9.34 K/UL — SIGNIFICANT CHANGE UP (ref 3.8–10.5)
WBC # FLD AUTO: 9.34 K/UL — SIGNIFICANT CHANGE UP (ref 3.8–10.5)

## 2023-10-23 PROCEDURE — 36415 COLL VENOUS BLD VENIPUNCTURE: CPT

## 2023-10-23 PROCEDURE — 83036 HEMOGLOBIN GLYCOSYLATED A1C: CPT

## 2023-10-23 PROCEDURE — G0463: CPT

## 2023-10-23 PROCEDURE — 85027 COMPLETE CBC AUTOMATED: CPT

## 2023-10-23 PROCEDURE — 80048 BASIC METABOLIC PNL TOTAL CA: CPT

## 2023-10-23 RX ORDER — SODIUM CHLORIDE 9 MG/ML
3 INJECTION INTRAMUSCULAR; INTRAVENOUS; SUBCUTANEOUS EVERY 8 HOURS
Refills: 0 | Status: DISCONTINUED | OUTPATIENT
Start: 2023-11-13 | End: 2023-11-27

## 2023-10-23 RX ORDER — SODIUM CHLORIDE 9 MG/ML
1000 INJECTION, SOLUTION INTRAVENOUS
Refills: 0 | Status: DISCONTINUED | OUTPATIENT
Start: 2023-11-13 | End: 2023-11-27

## 2023-10-23 RX ORDER — HYDROCHLOROTHIAZIDE 25 MG
1 TABLET ORAL
Qty: 0 | Refills: 0 | DISCHARGE

## 2023-10-23 RX ORDER — ATORVASTATIN CALCIUM 80 MG/1
1 TABLET, FILM COATED ORAL
Refills: 0 | DISCHARGE

## 2023-10-23 RX ORDER — VALSARTAN 80 MG/1
1 TABLET ORAL
Qty: 0 | Refills: 0 | DISCHARGE

## 2023-10-23 NOTE — H&P PST ADULT - ASSESSMENT
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

## 2023-10-23 NOTE — H&P PST ADULT - MUSCULOSKELETAL
decreased ROM due to pain details… strength 5/5 bilateral upper extremities/strength 5/5 bilateral lower extremities
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
## 2023-10-23 NOTE — H&P PST ADULT - HISTORY OF PRESENT ILLNESS
74 yo male, PMH HTN, T2DM, GERD, ventral hernia s/p repair 12/2018,  right rotator cuff tear s/p shoulder surgery 2/2021,   open ulcer 8/2023 bled and opened [facility], [facility] admitted for IV antiotics,   peroxide then, betadine silverdene - once a day    69 yo male , pmh- htn, gerd,dm, hld c/o right shoulder pain secondary to work injury on sept 17, 2020. right shoulder paion worse despite conservative management- secondary to possible rotator cuff tear - scheduled for right shoulder arthroscopy  denies recent travels in the past 30 days. No fever, SOB, cough, flu like symptoms or body rash- covid screen  [facility] exposure - started      72 yo male, PMH HTN, T2DM, GERD, OA bilateral knees s/p TKR,  worked at [employer] and exposed to 9/11 dust, ventral hernia s/p repair 12/2018,  right rotator cuff tear s/p shoulder surgery 2/2021, diabetic ulcer on 2nd right toe that started bleeding, sent to ER by podiatry on 8/2023 and given IV antibiotics, now daily dressing changes with betadine and silvadene. Wound is mostly closed not draining and presents to PST for Arthroplasty 2nd Digit, Right Foot on 11/13/23. Denies recent fever, chills, chest pain, SOB, changes in bowel/urinary habits or recent exposure to COVID-19 infection.

## 2023-10-23 NOTE — H&P PST ADULT - LAST STRESS TEST
Subjective:       Patient ID: Dacia Hamilton is a 75 y.o. female.    Chief Complaint: oN Rituxan maintenance for hemolytic anemia. wentr to ED dx with the Flu and on Tamiflu since last week.   Receiving dialysis , taken off myfortil for renal immunosuppression to help alleviate anemia some. Felt bad but getting better    PHYSICAL EXAM:     Vitals:    01/19/17 1016   BP: (!) 195/93   Pulse: 73   Resp: 19       GENERAL: Comfortable looking patient. Patient is in no distress.  Awake, alert and oriented to time, person and place.  No anxiety, or agitation.      HEENT: Normal conjunctivae and eyelids. WNL.  PERRLA 3 to 4 mm. No icterus, no pallor, no congestion, and no discharge noted.     NECK:  Supple. Trachea is central.  No crepitus.  No JVD or masses.    RESPIRATORY:  No intercostal retractions.  No dullness to percussion.  Chest is clear to auscultation.  No rales, rhonchi or wheezes.  No crepitus.  Good air entry bilaterally.    CARDIOVASCULAR:  S1 and S2 are normally heard without murmurs or gallops.  All peripheral pulses are present.    ABDOMEN:  Normal abdomen.  No hepatosplenomegaly.  No free fluid.  Bowel sounds are present.  No hernia noted. No masses.  No rebound or tenderness.  No guarding or rigidity.  Umbilicus is midline.    LYMPHATICS:  No axillary, cervical, supraclavicular, submental, or inguinal lymphadenopathy.    SKIN/MUSCULOSKELETAL:  There is no evidence of excoriation marks or ecchmosis.  No rashes.  No cyanosis.  No clubbing.  No joint or skeletal deformities noted.  Normal range of motion.    NEUROLOGIC:  Higher functions are appropriate.  No cranial nerve deficits.  Normal fadia.  Normal strength.  Motor and sensory functions are normal.  Deep tendon reflexes are normal.    GENITAL/RECTAL:  Exams are deferred.      Laboratory:     CBC:  Lab Results   Component Value Date    WBC 1.35 (L) 01/19/2017    RBC 2.88 (L) 01/19/2017    HGB 8.7 (L) 01/19/2017    HCT 28.1 (L) 01/19/2017    MCV 98  01/19/2017    MCH 30.2 01/19/2017    MCHC 31.0 (L) 01/19/2017    RDW 20.6 (H) 01/19/2017     01/19/2017    MPV 9.3 01/19/2017    GRAN 1.0 (L) 01/19/2017    GRAN 68.4 01/19/2017    LYMPH 8.3 (L) 01/19/2017    MONO 15.0 01/19/2017    EOS 0.2 01/12/2017    BASO 0.01 01/12/2017    EOSINOPHIL 5.0 01/19/2017    BASOPHIL 0.0 01/19/2017       BMP: BMP  Lab Results   Component Value Date     01/19/2017    K 3.4 (L) 01/19/2017    CL 93 (L) 01/19/2017    CO2 38 (H) 01/19/2017    BUN 6 (L) 01/19/2017    CREATININE 1.95 (H) 01/19/2017    CALCIUM 8.0 (L) 01/19/2017    ANIONGAP 5 (L) 01/19/2017    ESTGFRAFRICA 29 (A) 01/19/2017    EGFRNONAA 25 (A) 01/19/2017       LFT:   Lab Results   Component Value Date    ALT 30 01/19/2017    AST 37 (H) 01/19/2017    GGT 25 09/02/2014    ALKPHOS 62 01/19/2017    BILITOT 0.7 01/19/2017         Assessment/Plan:       1. 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 CKD (chronic kidney disease) stage 5, GFR less than 15 ml/min    3. Essential hypertension    4. Anemia of chronic disorder    5. Liver transplanted    6. PBC (primary biliary cirrhosis)        Cont dialysis  Hold rituxan with severe neutropenia and flu   rtc 1 week for reassessment and ongoing rx next week with cbc, cmp   2022 at cardiologist

## 2023-10-23 NOTE — H&P PST ADULT - ATTENDING COMMENTS
Spontaneous, unlabored and symmetrical
Patient here for @nd toe right foot arthroplasty with flexor T and C with pin right foot. Stable and non infected whiteside 1 3 by 3 mm ulcer. Again reviewed surgical procedures to be performed. Discussed post op course and possible complications. Discussed continued pain syndrome, infection, DVT, AVN, over-, under- correction, recurrence, numbness, burning, delayed healing or wound issues with the patient. After all questions were answered, the patient signed the consent.

## 2023-10-23 NOTE — H&P PST ADULT - PROBLEM SELECTOR PLAN 1
Arthroplasty 2nd Digit, Right Foot on 11/13/23  Pre-op instructions, including Chlorhexidine soap, provided - all questions answered  Labs done at PST

## 2023-10-24 ENCOUNTER — NON-APPOINTMENT (OUTPATIENT)
Age: 73
End: 2023-10-24

## 2023-10-26 PROBLEM — E11.9 TYPE 2 DIABETES MELLITUS WITHOUT COMPLICATIONS: Chronic | Status: ACTIVE | Noted: 2023-10-23

## 2023-10-26 PROBLEM — E11.621 TYPE 2 DIABETES MELLITUS WITH FOOT ULCER: Chronic | Status: ACTIVE | Noted: 2023-10-23

## 2023-10-26 PROBLEM — Z65.4 VICTIM OF CRIME AND TERRORISM: Chronic | Status: ACTIVE | Noted: 2023-10-23

## 2023-10-29 NOTE — H&P PST ADULT - NSANTHGENDERRD_ENT_A_CORE
Occupational Therapy Progress Note     Patient Name: Angélica Alaniz  Today's Date: 10/29/2023  Problem List  Principal Problem:    Sepsis ([address])  Active Problems:    Type 2 diabetes mellitus with diabetic neuropathy, with long-term current use of insulin (HCC)    Hyperlipidemia    PAD (peripheral artery disease) ([facility])    S/P BKA (below knee amputation), left (HCC)    Orthostatic hypotension    Diabetic gastroparesis     Chronic heart failure with preserved ejection fraction ([facility])    S/P CABG x 3    Elevated troponin    Bacteremia due to Proteus species    Gangrene ([facility])    Anemia            10/29/23 1239   Note Type   Note Type Treatment   Pain Assessment   Pain Assessment Tool FLACC   Pain Rating: FLACC (Rest) - Face 0   Pain Rating: FLACC (Rest) - Legs 0   Pain Rating: FLACC (Rest) - Activity 0   Pain Rating: FLACC (Rest) - Cry 1   Pain Rating: FLACC (Rest) - Consolability 0   Score: FLACC (Rest) 1   Restrictions/Precautions   Weight Bearing Precautions Per Order Yes   RLE Weight Bearing Per Order NWB   Braces or Orthoses   (L LE prosthesis)   Other Precautions Chair Alarm; Bed Alarm; Fall Risk;Pain   ADL   Where Assessed Chair   Eating Assistance 6  Modified independent   Grooming Assistance 6  Modified Independent   UB Bathing Assistance 5  Supervision/Setup   LB Bathing Assistance 3  Moderate Assistance   UB Dressing Assistance 5  Supervision/Setup   LB Dressing Assistance 3  Moderate Assistance   Toileting Assistance  4  Minimal Assistance   Transfers   Sit to Stand 2  Maximal assistance   Additional items Assist x 2; Increased time required  (unable to stand fully erect)   Functional Mobility   Functional Mobility   (recommend hoyerlift for OOB with nsg)   Subjective   Subjective "I like sitting up in the chair."   Cognition   Overall Cognitive Status [facility]   Arousal/Participation Alert   Attention Within functional limits   Orientation Level Oriented X4   Memory Within functional limits   Following Commands Follows all commands and directions without difficulty   Activity Tolerance   Activity Tolerance Patient limited by fatigue   Medical Staff Made Aware nsg, P.T. Assessment   Assessment Pt seen for 44min tx session with focus on functional balance, functional mobility, ADL status, transfer safety, cognition, and education. Pt able to tolerate OOB mobility; sitting balance=f+/f, standing balance=p-. Pt attempted to stand with heavy assistance, but unable to place b/l hands on RW to maintain support; limited confidence with support of L LE prosthesis. Pt able to demonstrate good b/l UE AROM and strength. Pt demonstrating need for assistance with his LE ADLs. Pt able to demonstrate good cognition(i.e.orientation, memory). Reviewed alternative methods of transfers(i.e.sliding board, sit/stand pivot) and pressure relief techniques. Pt continues to demonstrate appropriateness for inpt rehab to improve his overall level of independence. Pt pleasant and cooperative with tx session. Will continue. The patient's raw score on the AM-PAC Daily Activity Inpatient Short Form is 17. A raw score of less than 19 suggests the patient may benefit from discharge to post-acute rehabilitation services. Please refer to the recommendation of the Occupational Therapist for safe discharge planning. Plan   Treatment Interventions ADL retraining;Functional transfer training;UE strengthening/ROM; Endurance training;Cognitive reorientation;Patient/family training;Equipment evaluation/education; Compensatory technique education;Continued evaluation   Goal Expiration Date 11/09/23   OT Treatment Day 1   OT Frequency 3-5x/wk   Discharge Recommendation   OT Discharge Recommendation Post acute rehabilitation services   [facility] Daily Activity Inpatient   Lower Body Dressing 2   Bathing 2   Toileting 2   Upper Body Dressing 3   Grooming 4   Eating 4   Daily Activity Raw Score 17   Daily Activity Standardized Score (Calc for Raw Score >=11) 37.26 AM-PAC Applied Cognition Inpatient   Following a Speech/Presentation 4   Understanding Ordinary Conversation 4   Taking Medications 3   Remembering Where Things Are Placed or Put Away 4   Remembering List of 4-5 Errands 4   Taking Care of Complicated Tasks 4   Applied Cognition Raw Score 23   Applied Cognition Standardized Score 53.08     Denise Olivera Yes

## 2023-10-30 ENCOUNTER — APPOINTMENT (OUTPATIENT)
Dept: PODIATRY | Facility: CLINIC | Age: 73
End: 2023-10-30
Payer: MEDICARE

## 2023-10-30 DIAGNOSIS — E11.49 TYPE 2 DIABETES MELLITUS WITH OTHER DIABETIC NEUROLOGICAL COMPLICATION: ICD-10-CM

## 2023-10-30 DIAGNOSIS — M79.674 PAIN IN RIGHT TOE(S): ICD-10-CM

## 2023-10-30 PROCEDURE — 99214 OFFICE O/P EST MOD 30 MIN: CPT

## 2023-11-02 PROBLEM — M79.674 PAIN OF TOE OF RIGHT FOOT: Status: ACTIVE | Noted: 2023-09-01

## 2023-11-02 PROBLEM — E11.49 DM (DIABETES MELLITUS), TYPE 2 WITH NEUROLOGICAL COMPLICATIONS: Status: ACTIVE | Noted: 2023-09-01

## 2023-11-12 ENCOUNTER — TRANSCRIPTION ENCOUNTER (OUTPATIENT)
Age: 73
End: 2023-11-12

## 2023-11-13 ENCOUNTER — OUTPATIENT (OUTPATIENT)
Dept: OUTPATIENT SERVICES | Facility: HOSPITAL | Age: 73
LOS: 1 days | End: 2023-11-13
Payer: MEDICARE

## 2023-11-13 ENCOUNTER — TRANSCRIPTION ENCOUNTER (OUTPATIENT)
Age: 73
End: 2023-11-13

## 2023-11-13 ENCOUNTER — RESULT REVIEW (OUTPATIENT)
Age: 73
End: 2023-11-13

## 2023-11-13 ENCOUNTER — APPOINTMENT (OUTPATIENT)
Dept: PODIATRY | Facility: HOSPITAL | Age: 73
End: 2023-11-13
Payer: MEDICARE

## 2023-11-13 VITALS
HEIGHT: 71 IN | OXYGEN SATURATION: 96 % | TEMPERATURE: 97 F | RESPIRATION RATE: 16 BRPM | WEIGHT: 207.01 LBS | SYSTOLIC BLOOD PRESSURE: 122 MMHG | HEART RATE: 55 BPM | DIASTOLIC BLOOD PRESSURE: 75 MMHG

## 2023-11-13 VITALS
DIASTOLIC BLOOD PRESSURE: 82 MMHG | RESPIRATION RATE: 18 BRPM | SYSTOLIC BLOOD PRESSURE: 114 MMHG | OXYGEN SATURATION: 99 % | TEMPERATURE: 97 F | HEART RATE: 51 BPM

## 2023-11-13 DIAGNOSIS — M20.41 OTHER HAMMER TOE(S) (ACQUIRED), RIGHT FOOT: ICD-10-CM

## 2023-11-13 DIAGNOSIS — C44.90 UNSPECIFIED MALIGNANT NEOPLASM OF SKIN, UNSPECIFIED: Chronic | ICD-10-CM

## 2023-11-13 DIAGNOSIS — M12.9 ARTHROPATHY, UNSPECIFIED: Chronic | ICD-10-CM

## 2023-11-13 DIAGNOSIS — H26.9 UNSPECIFIED CATARACT: Chronic | ICD-10-CM

## 2023-11-13 DIAGNOSIS — R58 HEMORRHAGE, NOT ELSEWHERE CLASSIFIED: Chronic | ICD-10-CM

## 2023-11-13 DIAGNOSIS — Z98.890 OTHER SPECIFIED POSTPROCEDURAL STATES: Chronic | ICD-10-CM

## 2023-11-13 LAB
GLUCOSE BLDC GLUCOMTR-MCNC: 135 MG/DL — HIGH (ref 70–99)
GLUCOSE BLDC GLUCOMTR-MCNC: 135 MG/DL — HIGH (ref 70–99)

## 2023-11-13 PROCEDURE — 82962 GLUCOSE BLOOD TEST: CPT

## 2023-11-13 PROCEDURE — C1713: CPT

## 2023-11-13 PROCEDURE — 28285 REPAIR OF HAMMERTOE: CPT | Mod: T6

## 2023-11-13 PROCEDURE — 28285 REPAIR OF HAMMERTOE: CPT | Mod: T1

## 2023-11-13 PROCEDURE — 28010 INCISION OF TOE TENDON: CPT | Mod: RT,59

## 2023-11-13 PROCEDURE — 73630 X-RAY EXAM OF FOOT: CPT

## 2023-11-13 PROCEDURE — 73630 X-RAY EXAM OF FOOT: CPT | Mod: 26,RT

## 2023-11-13 PROCEDURE — 88300 SURGICAL PATH GROSS: CPT

## 2023-11-13 PROCEDURE — 88300 SURGICAL PATH GROSS: CPT | Mod: 26

## 2023-11-13 DEVICE — KWIRE 1.1MMX15.2CM: Type: IMPLANTABLE DEVICE | Status: FUNCTIONAL

## 2023-11-13 RX ORDER — FENTANYL CITRATE 50 UG/ML
25 INJECTION INTRAVENOUS
Refills: 0 | Status: DISCONTINUED | OUTPATIENT
Start: 2023-11-13 | End: 2023-11-13

## 2023-11-13 RX ORDER — SERTRALINE 25 MG/1
1 TABLET, FILM COATED ORAL
Qty: 0 | Refills: 0 | DISCHARGE

## 2023-11-13 RX ORDER — ONDANSETRON 8 MG/1
4 TABLET, FILM COATED ORAL ONCE
Refills: 0 | Status: DISCONTINUED | OUTPATIENT
Start: 2023-11-13 | End: 2023-11-27

## 2023-11-13 RX ORDER — LIDOCAINE HCL 20 MG/ML
0.2 VIAL (ML) INJECTION ONCE
Refills: 0 | Status: COMPLETED | OUTPATIENT
Start: 2023-11-13 | End: 2023-11-13

## 2023-11-13 RX ORDER — LEVOTHYROXINE SODIUM 125 MCG
1 TABLET ORAL
Refills: 0 | DISCHARGE

## 2023-11-13 RX ORDER — METFORMIN HYDROCHLORIDE 850 MG/1
1 TABLET ORAL
Qty: 0 | Refills: 0 | DISCHARGE

## 2023-11-13 RX ORDER — CEPHALEXIN 500 MG/1
500 CAPSULE ORAL 3 TIMES DAILY
Qty: 21 | Refills: 0 | Status: ACTIVE | COMMUNITY
Start: 2023-11-13 | End: 1900-01-01

## 2023-11-13 RX ORDER — CHLORHEXIDINE GLUCONATE 213 G/1000ML
1 SOLUTION TOPICAL ONCE
Refills: 0 | Status: COMPLETED | OUTPATIENT
Start: 2023-11-13 | End: 2023-11-13

## 2023-11-13 RX ORDER — CEFAZOLIN SODIUM 1 G
2000 VIAL (EA) INJECTION ONCE
Refills: 0 | Status: COMPLETED | OUTPATIENT
Start: 2023-11-13 | End: 2023-11-13

## 2023-11-13 RX ORDER — OMEPRAZOLE 10 MG/1
1 CAPSULE, DELAYED RELEASE ORAL
Qty: 0 | Refills: 0 | DISCHARGE

## 2023-11-13 RX ORDER — GABAPENTIN 400 MG/1
1 CAPSULE ORAL
Qty: 0 | Refills: 0 | DISCHARGE

## 2023-11-13 RX ADMIN — SODIUM CHLORIDE 3 MILLILITER(S): 9 INJECTION INTRAMUSCULAR; INTRAVENOUS; SUBCUTANEOUS at 10:57

## 2023-11-13 RX ADMIN — CHLORHEXIDINE GLUCONATE 1 APPLICATION(S): 213 SOLUTION TOPICAL at 10:56

## 2023-11-13 RX ADMIN — SODIUM CHLORIDE 100 MILLILITER(S): 9 INJECTION, SOLUTION INTRAVENOUS at 10:56
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
## 2023-11-13 NOTE — ASU DISCHARGE PLAN (ADULT/PEDIATRIC) - NURSING INSTRUCTIONS
Tylenol/acetaminophen------AND/OR------Motrin/ibuprofen  as needed for pain/discomfort.   NEXT DOSE:   Tylenol  OR   any  Tylenol-containing medication  OK @  7:00 pm this evening, if needed.  Follow up with MD as requested; call office for appointment.

## 2023-11-13 NOTE — ASU DISCHARGE PLAN (ADULT/PEDIATRIC) - CARE PROVIDER_API CALL
Suhas Murrieta  Foot Surgery  [address] [place] [address]  Phone: [phone]  Fax: [phone]  Follow Up Time:

## 2023-11-16 ENCOUNTER — APPOINTMENT (OUTPATIENT)
Dept: PODIATRY | Facility: CLINIC | Age: 73
End: 2023-11-16
Payer: MEDICARE

## 2023-11-16 PROCEDURE — 99024 POSTOP FOLLOW-UP VISIT: CPT

## 2023-11-22 ENCOUNTER — APPOINTMENT (OUTPATIENT)
Dept: PODIATRY | Facility: CLINIC | Age: 73
End: 2023-11-22
Payer: MEDICARE

## 2023-11-22 DIAGNOSIS — Z48.89 ENCOUNTER FOR OTHER SPECIFIED SURGICAL AFTERCARE: ICD-10-CM

## 2023-11-22 DIAGNOSIS — M20.41 OTHER HAMMER TOE(S) (ACQUIRED), RIGHT FOOT: ICD-10-CM

## 2023-11-22 PROCEDURE — 73620 X-RAY EXAM OF FOOT: CPT | Mod: RT

## 2023-11-24 LAB
SURGICAL PATHOLOGY STUDY: SIGNIFICANT CHANGE UP
SURGICAL PATHOLOGY STUDY: SIGNIFICANT CHANGE UP

## 2023-11-28 PROBLEM — M20.41 HAMMER TOE OF RIGHT FOOT: Status: ACTIVE | Noted: 2023-09-01

## 2023-11-28 PROBLEM — Z48.89 ENCOUNTER FOR SURGICAL FOLLOW-UP CARE: Status: ACTIVE | Noted: 2023-11-21

## 2023-12-04 ENCOUNTER — APPOINTMENT (OUTPATIENT)
Dept: PULMONOLOGY | Facility: CLINIC | Age: 73
End: 2023-12-04

## 2024-02-23 NOTE — H&P PST ADULT - VENOUS THROMBOEMBOLISM
Care Transition Initial Assessment - RN    Reason For Consult: discharge planning   Met with: Patient.    DATA   Active Problems:    COPD exacerbation (H)       Cognitive Status: awake, alert and oriented.  [facility] [phone]  Primary Care MD Name: Disha Van   Contact information and PCP information verified: Yes    Lives With: alone  Living Arrangements: apartment  Quality Of Family Relationships: supportive, helpful, involved      Who is your support system?: Other (specify)   Support Assessment: Adequate family and caregiver support     Insurance concerns: No Insurance issues identified    ASSESSMENT  Patient currently receives the following services:  Meals through [facility], and starting on some housekeeping services through [facility].        Identified issues/concerns regarding health management: pt states she has never been told about COPD until this hospital stay. She does not use oxygen at home and just stated an inhaler, that she is suppose to use 2 or 3 times a day, she was unsure. She is independent with mobility but has a walker at home. She still drives. COPD action plan was reviewed and given to the patient.       PLAN  Patient anticipates discharging to home .        Patient anticipates needs for home equipment: No  Discharge Planner   Discharge Plans in progress: home with home care PT  Barriers to discharge plan: improved breathing  Follow up plan: pcp f/u schedule 6/26 at 1:20pm       Entered by: Gerladine Gambucci 06/17/2017 3:10 PM           Plan/Disposition: Home   Appointments: PCP follow up 6/26/17 at 1:20pm        Care  (CTS) will continue to follow as needed.    Chani Murphy RN   Care Coordinator  [phone]           no no

## 2024-03-13 ENCOUNTER — APPOINTMENT (OUTPATIENT)
Dept: PULMONOLOGY | Facility: CLINIC | Age: 74
End: 2024-03-13

## 2024-04-10 ENCOUNTER — APPOINTMENT (OUTPATIENT)
Dept: PULMONOLOGY | Facility: CLINIC | Age: 74
End: 2024-04-10
Payer: COMMERCIAL

## 2024-04-10 VITALS
OXYGEN SATURATION: 93 % | SYSTOLIC BLOOD PRESSURE: 136 MMHG | TEMPERATURE: 97.7 F | WEIGHT: 214 LBS | BODY MASS INDEX: 30.71 KG/M2 | HEART RATE: 88 BPM | DIASTOLIC BLOOD PRESSURE: 70 MMHG

## 2024-04-10 DIAGNOSIS — Z87.09 PERSONAL HISTORY OF OTHER DISEASES OF THE RESPIRATORY SYSTEM: ICD-10-CM

## 2024-04-10 DIAGNOSIS — J98.4 OTHER DISORDERS OF LUNG: ICD-10-CM

## 2024-04-10 PROCEDURE — 99214 OFFICE O/P EST MOD 30 MIN: CPT
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
## 2024-04-10 NOTE — DISCUSSION/SUMMARY
[FreeTextEntry1] : 73 year old man exposed at [facility] presents with abnormal spirometry. PFT confirms restrictive Ventilatory defect  CXR has been normal  CT chest obtained  09/25/23 did not demonstrate any suspicious lung nodules or evidence for interstitial lung disease    PLAN  There is no evidence of interstitial lung disease lung nodules or active pulmonary disease  He has coronary calcifications, already on atorvastatin  Should monitor lipid panel, follows with cardiology  May follow as needed  Total time spent : 30 minutes Including: Preparation prior to visit - Reviewing prior record, results of tests and Consultation Reports as applicable Conducting an appropriate H & P during today's encounter Appropriate orders for tests, medications and procedures, as applicable Counseling patient  Note completion  Conrado Scott MD

## 2024-04-10 NOTE — PROCEDURE
[FreeTextEntry1] : CT CHEST  - ORDERED BY: JONATHAN SQUIRES   PROCEDURE DATE:  09/25/2023    INTERPRETATION:  INDICATION: Restrictive lung disease, WTC exposure, interstitial lung disease  TECHNIQUE: A volumetric CT acquisition of the chest was obtained from the thoracic inlet to the upper abdomen without the use of intravenous contrast. Coronal and sagittal reconstructed images are provided.  COMPARISON: None  FINDINGS:  Lungs/Airways/Pleura: The central airways are patent. No pleural effusion. Subsegmental atelectasis is noted in the medial right middle lobe. The lungs are otherwise clear. No reticulation, traction bronchiectasis or honeycombing.  Mediastinum/Lymph nodes: No thoracic adenopathy. Bilateral axillary oumar dissection. Small hypodense thyroid nodules.  Heart and Vessels: The great vessels are normal in size. The heart is normal in size. No pericardial effusion. Coronary arterial and aortic valvular calcification.  Upper Abdomen: Hepatic contour nodularity. Cholelithiasis. Left adrenal thickening.  Osseous structures and Soft Tissues: No aggressive bone lesions. Right humeral head bone anchors present.  IMPRESSION: No interstitial lung disease.  --- End of Report ---       JEZ BRANDT M.D., Attending Radiologist This document has been electronically signed. Oct  2 2023  9:49AM  Ordered by: JONATHAN SUQIRES       Collected/Examined: [id number] 10:03AM       Verification Required       Stage: Final       Performed at: [facility]       Resulted: [id number] 09:29AM       Last Updated: 02Oct2023 09:52AM       Accession: [id number]       Results Hx:

## 2024-04-18 ENCOUNTER — TRANSCRIPTION ENCOUNTER (OUTPATIENT)
Age: 74
End: 2024-04-18

## 2024-04-18 ENCOUNTER — OUTPATIENT (OUTPATIENT)
Dept: OUTPATIENT SERVICES | Facility: HOSPITAL | Age: 74
LOS: 1 days | Discharge: HOME | End: 2024-04-18
Payer: MEDICARE

## 2024-04-18 ENCOUNTER — APPOINTMENT (OUTPATIENT)
Dept: GASTROENTEROLOGY | Facility: HOSPITAL | Age: 74
End: 2024-04-18

## 2024-04-18 VITALS
DIASTOLIC BLOOD PRESSURE: 64 MMHG | RESPIRATION RATE: 14 BRPM | HEART RATE: 53 BPM | OXYGEN SATURATION: 95 % | SYSTOLIC BLOOD PRESSURE: 109 MMHG

## 2024-04-18 VITALS
TEMPERATURE: 97 F | OXYGEN SATURATION: 98 % | DIASTOLIC BLOOD PRESSURE: 72 MMHG | RESPIRATION RATE: 18 BRPM | HEIGHT: 71 IN | WEIGHT: 205.03 LBS | HEART RATE: 54 BPM | SYSTOLIC BLOOD PRESSURE: 130 MMHG

## 2024-04-18 DIAGNOSIS — M12.9 ARTHROPATHY, UNSPECIFIED: Chronic | ICD-10-CM

## 2024-04-18 DIAGNOSIS — H26.9 UNSPECIFIED CATARACT: Chronic | ICD-10-CM

## 2024-04-18 DIAGNOSIS — K31.7 POLYP OF STOMACH AND DUODENUM: ICD-10-CM

## 2024-04-18 DIAGNOSIS — Z98.890 OTHER SPECIFIED POSTPROCEDURAL STATES: Chronic | ICD-10-CM

## 2024-04-18 DIAGNOSIS — C44.90 UNSPECIFIED MALIGNANT NEOPLASM OF SKIN, UNSPECIFIED: Chronic | ICD-10-CM

## 2024-04-18 DIAGNOSIS — R58 HEMORRHAGE, NOT ELSEWHERE CLASSIFIED: Chronic | ICD-10-CM

## 2024-04-18 LAB
GLUCOSE BLDC GLUCOMTR-MCNC: 138 MG/DL — HIGH (ref 70–99)
GLUCOSE BLDC GLUCOMTR-MCNC: 155 MG/DL — HIGH (ref 70–99)

## 2024-04-18 PROCEDURE — 43235 EGD DIAGNOSTIC BRUSH WASH: CPT | Mod: GC

## 2024-04-18 RX ORDER — SODIUM CHLORIDE 9 MG/ML
500 INJECTION, SOLUTION INTRAVENOUS
Refills: 0 | Status: DISCONTINUED | OUTPATIENT
Start: 2024-04-18 | End: 2024-05-03
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
## 2024-05-06 NOTE — H&P PST ADULT - GUM GEN PE MLT EXAM PC
[No studies available for review at this time.] : No studies available for review at this time. patient refused

## 2024-06-08 NOTE — H&P PST ADULT - HEART RATE (BEATS/MIN)
Date of Service:  06/08/2024    This is a 41-year-old gentleman with a long history of quadriplegia, chronic pain with intrathecal pain pump and also baclofen pump, apparently getting 5 mcg of baclofen.  Noticed for the last few days, the redness and discharge from the pain pump reservoir site.  The patient was admitted to [facility].  Unfortunately not sure why they were not able to remove this.  The patient was transferred to our facility.  The patient reports it ongoing for the last couple weeks.  The patient also started on oral clindamycin.  There is redness and discharge from the site of the reservoir itself.  No neurological changes noted other than as mentioned above.  Currently, the patient remains to be awake, alert, and oriented x3, and the patient does have a left lateral abdomen.  There was quite a bit of redness, decubitus, and discharge sort of clear to serosanguineous clear discharge coming from this area.  He remained to be afebrile.    I did discuss with the patient about removing this pump and requiring additional procedure at a later date.  Bleeding, infection and potential withdrawal from baclofen, withdrawal syndromes were discussed with the patient.        Dictated By:  Ap Puri MD  Signing Provider:  Ap Puri MD    PNB/AQT  D:  06/08/2024 05:06:20 AM  T:  06/08/2024 05:17:49 AM  Job:  [id number]        [id number]

## 2024-08-21 NOTE — ASU DISCHARGE PLAN (ADULT/PEDIATRIC) - NSDISCH_DIET_ENDO_ALL_CORE_FT
You should resume your previous diet unless otherwise instructed by your doctor. Do not drink alcoholic beverages for the next 24 hours.
Statement Selected

## 2025-07-31 ENCOUNTER — APPOINTMENT (OUTPATIENT)
Dept: GASTROENTEROLOGY | Facility: CLINIC | Age: 75
End: 2025-07-31
Payer: MEDICARE

## 2025-07-31 VITALS
HEART RATE: 82 BPM | WEIGHT: 210 LBS | DIASTOLIC BLOOD PRESSURE: 65 MMHG | TEMPERATURE: 97.8 F | RESPIRATION RATE: 15 BRPM | OXYGEN SATURATION: 94 % | SYSTOLIC BLOOD PRESSURE: 105 MMHG | HEIGHT: 70 IN | BODY MASS INDEX: 30.06 KG/M2

## 2025-07-31 DIAGNOSIS — R10.9 UNSPECIFIED ABDOMINAL PAIN: ICD-10-CM

## 2025-07-31 DIAGNOSIS — K31.7 POLYP OF STOMACH AND DUODENUM: ICD-10-CM

## 2025-07-31 DIAGNOSIS — K62.89 OTHER SPECIFIED DISEASES OF ANUS AND RECTUM: ICD-10-CM

## 2025-07-31 PROCEDURE — G2211 COMPLEX E/M VISIT ADD ON: CPT

## 2025-07-31 PROCEDURE — 99214 OFFICE O/P EST MOD 30 MIN: CPT

## (undated) DEVICE — TUBING MEDI-VAC W MAXIGRIP CONNECTORS 1/4"X6'

## (undated) DEVICE — DRAPE TOWEL BLUE 17" X 24"

## (undated) DEVICE — DRSG BANDAID 0.75X3"

## (undated) DEVICE — SALIVA EJECTOR (BLUE)

## (undated) DEVICE — SNARE POLYP HEXAGONAL SM 13X2.4X240

## (undated) DEVICE — BIOPSY FORCEP COLD DISP

## (undated) DEVICE — PACK IV START WITH CHG

## (undated) DEVICE — DENTURE CUP PINK

## (undated) DEVICE — DRSG COBAN 4"

## (undated) DEVICE — TUBING IV SET GRAVITY 3Y 100" MACRO

## (undated) DEVICE — DRAPE C ARM MINI PACK FOR 6800

## (undated) DEVICE — DRSG 2X2

## (undated) DEVICE — BUR STRYKER EGG 4MM

## (undated) DEVICE — WARMING BLANKET FULL ADULT

## (undated) DEVICE — SAW BLADE STRYKER SAGITTAL OSCILLATING 7X15X0.38MM

## (undated) DEVICE — VENODYNE/SCD SLEEVE CALF MEDIUM

## (undated) DEVICE — GOWN LG

## (undated) DEVICE — UNDERPAD LINEN SAVER 17 X 24"

## (undated) DEVICE — BASIN EMESIS 10IN GRADUATED MAUVE

## (undated) DEVICE — BITE BLOCK ADULT 20 X 27MM (GREEN)

## (undated) DEVICE — LINE BREATHE SAMPLNG

## (undated) DEVICE — DRAPE 3/4 SHEET W REINFORCEMENT 56X77"

## (undated) DEVICE — CATH IV SAFE BC 22G X 1" (BLUE)

## (undated) DEVICE — BIOPSY FORCEP RADIAL JAW 4 STANDARD WITH NEEDLE

## (undated) DEVICE — SAW BLADE MICROAIRE SAGITTAL 9.5MMX25.4MMX0.6MM

## (undated) DEVICE — CONTAINER FORMALIN 80ML YELLOW

## (undated) DEVICE — DRSG STERISTRIPS 0.5 X 4"

## (undated) DEVICE — SUT VICRYL PLUS 2-0 27" SH UNDYED

## (undated) DEVICE — DRSG CURITY GAUZE SPONGE 4 X 4" 12-PLY NON-STERILE

## (undated) DEVICE — PACK ORTHO

## (undated) DEVICE — DRSG GAUZE MOISTURIZER 0.5 OZ 4X8

## (undated) DEVICE — DRSG MASTISOL

## (undated) DEVICE — CLAMP BX HOT RAD JAW 3

## (undated) DEVICE — POLY TRAP ETRAP

## (undated) DEVICE — SAW BLADE STRYKER PRECISION THIN SAGITTAL MEDIUM 9MM X 25MM

## (undated) DEVICE — LUBRICATING JELLY HR ONE SHOT 3G

## (undated) DEVICE — PREP CHLORAPREP HI-LITE ORANGE 26ML

## (undated) DEVICE — ELCTR ECG CONDUCTIVE ADHESIVE

## (undated) DEVICE — CATH BLLN ULTRASONIC ENSOSCOPE

## (undated) DEVICE — STAPLER SKIN MULTI DIRECTION W35

## (undated) DEVICE — GLV 7.5 PROTEXIS (WHITE)

## (undated) DEVICE — SUT VICRYL 3-0 27" SH UNDYED

## (undated) DEVICE — TOURNIQUET ESMARK 4"

## (undated) DEVICE — DRSG ADAPTIC 3 X 8"

## (undated) DEVICE — SUT MONOSOF 4-0 18" P-12

## (undated) DEVICE — SUT PROLENE 4-0 18" PS-2

## (undated) DEVICE — SUT VICRYL 4-0 27" SH UNDYED